# Patient Record
Sex: FEMALE | Race: AMERICAN INDIAN OR ALASKA NATIVE
[De-identification: names, ages, dates, MRNs, and addresses within clinical notes are randomized per-mention and may not be internally consistent; named-entity substitution may affect disease eponyms.]

---

## 2017-12-20 ENCOUNTER — HOSPITAL ENCOUNTER (INPATIENT)
Dept: HOSPITAL 42 - ED | Age: 37
LOS: 8 days | Discharge: NURSING, REHAB OR LONG-TERM CARE | DRG: 560 | End: 2017-12-28
Attending: INTERNAL MEDICINE | Admitting: INTERNAL MEDICINE
Payer: MEDICAID

## 2017-12-20 DIAGNOSIS — K59.00: ICD-10-CM

## 2017-12-20 DIAGNOSIS — Z53.29: ICD-10-CM

## 2017-12-20 DIAGNOSIS — D53.9: ICD-10-CM

## 2017-12-20 DIAGNOSIS — M48.061: ICD-10-CM

## 2017-12-20 DIAGNOSIS — M19.90: ICD-10-CM

## 2017-12-20 DIAGNOSIS — F40.240: ICD-10-CM

## 2017-12-20 DIAGNOSIS — R40.2412: ICD-10-CM

## 2017-12-20 DIAGNOSIS — M50.221: Primary | ICD-10-CM

## 2017-12-20 DIAGNOSIS — M50.223: ICD-10-CM

## 2017-12-20 DIAGNOSIS — Z88.1: ICD-10-CM

## 2017-12-20 DIAGNOSIS — E53.8: ICD-10-CM

## 2017-12-20 DIAGNOSIS — E66.01: ICD-10-CM

## 2017-12-20 DIAGNOSIS — M50.222: ICD-10-CM

## 2017-12-20 DIAGNOSIS — G82.50: ICD-10-CM

## 2017-12-20 LAB
ALBUMIN/GLOB SERPL: 1.1 {RATIO} (ref 1.1–1.8)
ALP SERPL-CCNC: 80 U/L (ref 38–126)
ALT SERPL-CCNC: 40 U/L (ref 7–56)
AST SERPL-CCNC: 33 U/L (ref 14–36)
BASOPHILS # BLD AUTO: 0.04 K/MM3 (ref 0–2)
BASOPHILS NFR BLD: 0.6 % (ref 0–3)
BILIRUB SERPL-MCNC: 0.3 MG/DL (ref 0.2–1.3)
BUN SERPL-MCNC: 13 MG/DL (ref 7–21)
CALCIUM SERPL-MCNC: 9.5 MG/DL (ref 8.4–10.5)
CHLORIDE SERPL-SCNC: 106 MMOL/L (ref 98–107)
CO2 SERPL-SCNC: 23 MMOL/L (ref 21–33)
EOSINOPHIL # BLD: 0.2 10*3/UL (ref 0–0.7)
EOSINOPHIL NFR BLD: 2.1 % (ref 1.5–5)
ERYTHROCYTE [DISTWIDTH] IN BLOOD BY AUTOMATED COUNT: 13.6 % (ref 11.5–14.5)
GLOBULIN SER-MCNC: 3.6 GM/DL
GLUCOSE SERPL-MCNC: 93 MG/DL (ref 70–110)
GRANULOCYTES # BLD: 3.65 10*3/UL (ref 1.4–6.5)
GRANULOCYTES NFR BLD: 50.4 % (ref 50–68)
HCT VFR BLD CALC: 37 % (ref 36–48)
LYMPHOCYTES # BLD: 2.7 10*3/UL (ref 1.2–3.4)
LYMPHOCYTES NFR BLD AUTO: 38 % (ref 22–35)
MCH RBC QN AUTO: 35.1 PG (ref 25–35)
MCHC RBC AUTO-ENTMCNC: 33.5 G/DL (ref 31–37)
MCV RBC AUTO: 104.8 FL (ref 80–105)
MONOCYTES # BLD AUTO: 0.6 10*3/UL (ref 0.1–0.6)
MONOCYTES NFR BLD: 8.9 % (ref 1–6)
PLATELET # BLD: 388 10^3/UL (ref 120–450)
PMV BLD AUTO: 10.2 FL (ref 7–11)
POTASSIUM SERPL-SCNC: 3.9 MMOL/L (ref 3.6–5)
PROT SERPL-MCNC: 7.6 G/DL (ref 5.8–8.3)
SODIUM SERPL-SCNC: 140 MMOL/L (ref 132–148)
WBC # BLD AUTO: 7.2 10^3/UL (ref 4.5–11)

## 2017-12-20 NOTE — CT
EXAM:

  CT Head Without Intravenous Contrast



CLINICAL HISTORY:

  37 years old, female; Signs and symptoms; Walking, difficulty; Additional 

info: Unable to walk since 12/08/2017



TECHNIQUE:

  Axial computed tomography images of the head/brain without intravenous 

contrast.  All CT scans at this facility use one or more dose reduction 

techniques, viz.: automated exposure control; ma/kV adjustment per patient size 

(including targeted exams where dose is matched to indication; i.e. head); or 

iterative reconstruction technique.



COMPARISON:

  No relevant prior studies available.



FINDINGS:

  Brain:  No intracranial hemorrhage.  No mass.  No definite edema.

  Ventricles:  No hydrocephalus.

  Bones/joints:  No acute fracture.

  Soft tissues:  Unremarkable.

  Sinuses:  No acute sinusitis.

  Mastoid air cells:  No mastoid effusion.

  Orbits:  Unremarkable as visualized.



IMPRESSION:     

1.  No definite acute intracranial abnormality.

## 2017-12-20 NOTE — CT
EXAM:

  CT Lumbar Spine Without Intravenous Contrast



CLINICAL HISTORY:

  37 years old, female; Signs and symptoms; Other: Unable to walk since 

12/8/17; Additional info: Unable to walk since 12/08/2017



TECHNIQUE:

  Axial computed tomography images of the lumbar spine without intravenous 

contrast.  All CT scans at this facility use one or more dose reduction 

techniques, viz.: automated exposure control; ma/kV adjustment per patient size 

(including targeted exams where dose is matched to indication; i.e. head); or 

iterative reconstruction technique.

  Coronal and sagittal reformatted images were created and reviewed.



COMPARISON:

  No relevant prior studies available.



FINDINGS:

  Vertebrae:  No acute fracture.  Facet osteoarthrosis within lower lumbar 

spine.

  Discs/spinal canal/neural foramina:  Mild central canal stenosis L4-L5 level. 

 Mild neuroforaminal narrowing at L4-L5, and L5-S1 levels.

  Soft tissues:  Partial ossification of anterior longitudinal ligament.



IMPRESSION:     

1.  No fracture.

2.  If symptoms persist, consider MRI for further evaluation.

3.  Incidental/non-acute findings are described above.

## 2017-12-21 LAB
ALBUMIN/GLOB SERPL: 1.2 {RATIO} (ref 1.1–1.8)
ALP SERPL-CCNC: 76 U/L (ref 38–126)
ALT SERPL-CCNC: 42 U/L (ref 7–56)
APPEARANCE UR: CLEAR
AST SERPL-CCNC: 31 U/L (ref 14–36)
BACTERIA #/AREA URNS HPF: (no result) /[HPF]
BASOPHILS # BLD AUTO: 0.01 K/MM3 (ref 0–2)
BASOPHILS NFR BLD: 0.2 % (ref 0–3)
BILIRUB SERPL-MCNC: 0.4 MG/DL (ref 0.2–1.3)
BILIRUB UR-MCNC: NEGATIVE MG/DL
BUN SERPL-MCNC: 11 MG/DL (ref 7–21)
CALCIUM SERPL-MCNC: 9.4 MG/DL (ref 8.4–10.5)
CHLORIDE SERPL-SCNC: 106 MMOL/L (ref 98–107)
CO2 SERPL-SCNC: 25 MMOL/L (ref 21–33)
COLOR UR: YELLOW
EOSINOPHIL # BLD: 0.1 10*3/UL (ref 0–0.7)
EOSINOPHIL NFR BLD: 1.7 % (ref 1.5–5)
ERYTHROCYTE [DISTWIDTH] IN BLOOD BY AUTOMATED COUNT: 13.7 % (ref 11.5–14.5)
FOLATE SERPL-MCNC: 11 NG/ML
GLOBULIN SER-MCNC: 3.3 GM/DL
GLUCOSE SERPL-MCNC: 81 MG/DL (ref 70–110)
GLUCOSE UR STRIP-MCNC: NEGATIVE MG/DL
GRANULOCYTES # BLD: 3.52 10*3/UL (ref 1.4–6.5)
GRANULOCYTES NFR BLD: 54.4 % (ref 50–68)
HCT VFR BLD CALC: 35.5 % (ref 36–48)
KETONES UR STRIP-MCNC: 15 MG/DL
LEUKOCYTE ESTERASE UR-ACNC: (no result) LEU/UL
LYMPHOCYTES # BLD: 2.2 10*3/UL (ref 1.2–3.4)
LYMPHOCYTES NFR BLD AUTO: 34.6 % (ref 22–35)
MAGNESIUM SERPL-MCNC: 2 MG/DL (ref 1.7–2.2)
MCH RBC QN AUTO: 34.9 PG (ref 25–35)
MCHC RBC AUTO-ENTMCNC: 33.2 G/DL (ref 31–37)
MCV RBC AUTO: 105 FL (ref 80–105)
MONOCYTES # BLD AUTO: 0.6 10*3/UL (ref 0.1–0.6)
MONOCYTES NFR BLD: 9.1 % (ref 1–6)
PH UR STRIP: 6 [PH] (ref 4.7–8)
PHOSPHATE SERPL-MCNC: 4.3 MG/DL (ref 2.5–4.5)
PLATELET # BLD: 381 10^3/UL (ref 120–450)
PMV BLD AUTO: 10.5 FL (ref 7–11)
POTASSIUM SERPL-SCNC: 4.1 MMOL/L (ref 3.6–5)
PROT SERPL-MCNC: 7.1 G/DL (ref 5.8–8.3)
PROT UR STRIP-MCNC: NEGATIVE MG/DL
RBC # UR STRIP: (no result) /UL
RBC #/AREA URNS HPF: (no result) /HPF (ref 0–2)
SODIUM SERPL-SCNC: 141 MMOL/L (ref 132–148)
SP GR UR STRIP: 1.02 (ref 1–1.03)
UROBILINOGEN UR STRIP-ACNC: 0.2 E.U./DL
WBC # BLD AUTO: 6.5 10^3/UL (ref 4.5–11)
WBC #/AREA URNS HPF: (no result) /HPF (ref 0–6)

## 2017-12-21 NOTE — RAD
PROCEDURE:  CHEST RADIOGRAPH, 1 VIEW



HISTORY:

medical clearance



COMPARISON:

None available.



FINDINGS:



LUNGS:

Clear.



PLEURA:

No pneumothorax or pleural fluid seen.



CARDIOVASCULAR:

Normal.



OSSEOUS STRUCTURES:

No significant abnormalities.



VISUALIZED UPPER ABDOMEN:

Normal.



OTHER FINDINGS:

None. 



IMPRESSION:

No active disease.

## 2017-12-21 NOTE — CP.PCM.HP
<Nico Storey - Last Filed: 12/21/17 04:29>





History of Present Illness





- History of Present Illness


History of Present Illness: 





Nico Storey DO PGY1 - Internal Medicine H&P





CC: Weak legs





HPI: 36 yo F with no significant PMH presents to the ER by ambulance brought 

from [facility], complaining of progressive weakness in all 

four extremities. Weakness started 9 days ago, which was the last time she was 

able to walk. She was [place] [facility] [place] had an extensive 

workup done, which was reportedly negative. She was [place] [facility] about 

one week ago and sent to [facility] for physical therapy. She reports 

continued inability to walk or bear weight on her legs beyond a few seconds, as 

well as progressive weakness in her arms. She denies any headache, vision 

changes, numbness, parasthesias. She reports that weakness is worse on the left 

side. She denies recent illness, travel, sick contacts, bug bites. She also 

denies any major life changes or new stressors. She is complaining of 

occasional back spasms that radiate from her head all the way to her toes, 

which she has had before, though is occuring more frequently; no constant back 

pain or pain with movements. She denies any fever, chills, nausea, vomiting, 

diarrhea, chest pain, shortness of breath, abdominal pain, dysuria, hematuria, 

joint pains, confusion, depression/anxiety, trouble sleeping, heat/cold 

intolerance, urinary/bowel incontinence, saddle anaesthesia. Remainder of 12 

point ROS negative. The weakness was progressive in onset.





PMH: None


PSH: Denies


Soc: Denies tobacco, alcohol, or illicits


FHx: Denies


All: Amoxicillin





Present on Admission





- Present on Admission


Any Indicators Present on Admission: No





Past Patient History





- Past Social History


Smoking Status: n





- PSYCHIATRIC


Hx Substance Use: No





Meds


Allergies/Adverse Reactions: 


 Allergies











Allergy/AdvReac Type Severity Reaction Status Date / Time


 


amoxicillin Allergy  SHORTNESS Verified 12/20/17 19:47





   OF BREATH  














Physical Exam





- Constitutional


Appears: Non-toxic, No Acute Distress


Additional comments: 


Morbidly obese





- Head Exam


Head Exam: ATRAUMATIC, NORMOCEPHALIC





- Eye Exam


Eye Exam: EOMI, Normal appearance, PERRL


Pupil Exam: NORMAL ACCOMODATION





- ENT Exam


ENT Exam: Mucous Membranes Moist





- Neck Exam


Neck exam: Positive for: Full Rom, Normal Inspection.  Negative for: Meningismus





- Respiratory Exam


Respiratory Exam: Clear to Auscultation Bilateral, NORMAL BREATHING PATTERN





- Cardiovascular Exam


Cardiovascular Exam: RRR, +S1, +S2





- GI/Abdominal Exam


GI & Abdominal Exam: Normal Bowel Sounds, Soft.  absent: Distended, Firm, 

Rebound, Rigid, Tenderness





- Extremities Exam


Extremities exam: Positive for: full ROM, normal inspection.  Negative for: 

calf tenderness, joint swelling, pedal edema, tenderness





- Back Exam


Back exam: muscle spasm.  absent: paraspinal tenderness





- Neurological Exam


Neurological exam: Alert, CN II-XII Intact, Oriented x3


Additional comments: 





Bilateral biceps reflexes 1+/4


Patella reflexes could not be assessed, patient not compliant with exam


5/5  strength bilaterally; constantly gesturing strangely with her hands, 

holds cup to drink with difficulty; uses her cell phone normally


In attempt to stand/ambulate patient, she stood for approximately 4 seconds, 

and took 3 steps forward, before slowly collapsing to the floor


She was unable to stand back up unassisted, and required 5 people to help her 

get back to her feet.


Sensation grossly intact, with good sharp/dull discrimination


Patient is spontaneously moving and resting all four extremities normally, 

unconciously; when assessed directly, patient appears to have poor control of 

her movements





- Psychiatric Exam


Psychiatric exam: Normal Affect, Normal Mood





- Skin


Skin Exam: Dry, Intact, Normal Color





Results





- Vital Signs


Recent Vital Signs: 





 Last Vital Signs











Temp  98.8 F   12/20/17 19:40


 


Pulse  78   12/21/17 03:00


 


Resp  16   12/21/17 03:00


 


BP  128/82   12/21/17 03:00


 


Pulse Ox  100   12/21/17 03:00














- Labs


Result Diagrams: 


 12/20/17 20:40





 12/20/17 20:40





Assessment & Plan





- Assessment and Plan (Free Text)


Assessment: 





36 yo F with no significant PMH who presents for quadriparesis, progressive, 

for the past 9 days. Previously [place] [facility] and was undergoing physical 

therapy at [facility] in [facility]





Plan





Quadriparesis


- Patient has total inability to bear her own weight, walk, or exhibit strength 

or fine motor control of b/l UE, with marked L finger to nose dysmetria; 

started 9 days ago


- CT head and lumbar spine in ER negative


- Intake labs and vital signs unremarkable


- Will attempt to obtain prior records from [facility] prior to further workup; 

consider spinal tap if not previously done to r/o infection, GBS


- High risk fall precautions


- PT/OT eval and treat


- Neurology consult requested, appreciate recs





GI/DVT Ppx - Pepcid, heparin





Patient seen, discussed, and reviewed with attending





<Amadou Frazier - Last Filed: 12/21/17 05:41>





Results





- Vital Signs


Recent Vital Signs: 





 Last Vital Signs











Temp  98.2 F   12/21/17 04:27


 


Pulse  102 H  12/21/17 04:27


 


Resp  20   12/21/17 04:27


 


BP  145/99 H  12/21/17 04:27


 


Pulse Ox  100   12/21/17 03:00














- Labs


Result Diagrams: 


 12/20/17 20:40





 12/20/17 20:40





Attending/Attestation





- Attestation


I have personally seen and examined this patient.: Yes


I have fully participated in the care of the patient.: Yes


I have reviewed all pertinent clinical information: Yes


Notes (Text): 





12/21/17 05:41


Patient was seen in room # 9 when she was in the ER.


Agree with history, physical examination, assessment and plan.

## 2017-12-21 NOTE — MRI
PROCEDURE:  MRI of the brain dated 12/21/2017. 



HISTORY:

Ataxia. 



COMPARISON:

Comparison made with CT scan brain dated 12/20/2017. 



TECHNIQUE:

Multiplanar, multisequence MR images of the brain were obtained with 

and without intravenous contrast enhancement. 20 cc Omniscan injected 

during this procedure. 



FINDINGS:



HEMORRHAGE:

No acute parenchymal, subarachnoid nor extra-axial hemorrhage.  No 

evidence of hemosiderin deposition identified on gradient echo 

weighted sequence.



DWI:

No evidence of an acute or early subacute infarction seen on 

diffusion imaging. .



BRAIN PARENCHYMA:

No focal areas of abnormal signal seen within the substance of the 

brain. No evidence of enhancing parenchymal nor extra-axial masses or 

collections. No evidence of unusual meningeal enhancement. . 



Ventricular and sulcal size within range of normal for this patient's 

stated age. 



ENHANCEMENT:

No abnormal intracranial enhancement as above. .



VENTRICLES:

No obstructive hydrocephalus.



CRANIUM:

There are no acute calvarial abnormalities.



ORBITS:

Orbits and contents appear grossly unremarkable.



PARANASAL SINUSES/MASTOIDS:

Clear



VASCULAR SYSTEM:

The visualized major vascular flow voids at skull base patent.



OTHER FINDINGS:

None .



IMPRESSION:

No acute intracranial hemorrhage or infarct.  There are no enhancing 

lesions.

## 2017-12-22 LAB
ALBUMIN/GLOB SERPL: 1.1 {RATIO} (ref 1.1–1.8)
ALP SERPL-CCNC: 71 U/L (ref 38–126)
ALT SERPL-CCNC: 35 U/L (ref 7–56)
AST SERPL-CCNC: 31 U/L (ref 14–36)
BASOPHILS # BLD AUTO: 0.04 K/MM3 (ref 0–2)
BASOPHILS NFR BLD: 0.6 % (ref 0–3)
BILIRUB SERPL-MCNC: 0.5 MG/DL (ref 0.2–1.3)
BUN SERPL-MCNC: 8 MG/DL (ref 7–21)
CALCIUM SERPL-MCNC: 9.3 MG/DL (ref 8.4–10.5)
CHLORIDE SERPL-SCNC: 106 MMOL/L (ref 98–107)
CO2 SERPL-SCNC: 25 MMOL/L (ref 21–33)
EOSINOPHIL # BLD: 0.1 10*3/UL (ref 0–0.7)
EOSINOPHIL NFR BLD: 2.1 % (ref 1.5–5)
ERYTHROCYTE [DISTWIDTH] IN BLOOD BY AUTOMATED COUNT: 13.8 % (ref 11.5–14.5)
GLOBULIN SER-MCNC: 3.3 GM/DL
GLUCOSE SERPL-MCNC: 83 MG/DL (ref 70–110)
GRANULOCYTES # BLD: 2.73 10*3/UL (ref 1.4–6.5)
GRANULOCYTES NFR BLD: 44.3 % (ref 50–68)
HCT VFR BLD CALC: 36.1 % (ref 36–48)
LYMPHOCYTES # BLD: 2.7 10*3/UL (ref 1.2–3.4)
LYMPHOCYTES NFR BLD AUTO: 44.2 % (ref 22–35)
MAGNESIUM SERPL-MCNC: 1.9 MG/DL (ref 1.7–2.2)
MCH RBC QN AUTO: 35.1 PG (ref 25–35)
MCHC RBC AUTO-ENTMCNC: 33.5 G/DL (ref 31–37)
MCV RBC AUTO: 104.6 FL (ref 80–105)
MONOCYTES # BLD AUTO: 0.5 10*3/UL (ref 0.1–0.6)
MONOCYTES NFR BLD: 8.8 % (ref 1–6)
PHOSPHATE SERPL-MCNC: 4.7 MG/DL (ref 2.5–4.5)
PLATELET # BLD: 389 10^3/UL (ref 120–450)
PMV BLD AUTO: 10.2 FL (ref 7–11)
POTASSIUM SERPL-SCNC: 4 MMOL/L (ref 3.6–5)
PROT SERPL-MCNC: 7.2 G/DL (ref 5.8–8.3)
SODIUM SERPL-SCNC: 140 MMOL/L (ref 132–148)
WBC # BLD AUTO: 6.2 10^3/UL (ref 4.5–11)

## 2017-12-22 NOTE — CP.PCM.PN
<Lashanda Nicholson - Last Filed: 12/22/17 11:51>





Subjective





- Date & Time of Evaluation


Date of Evaluation: 12/22/17


Time of Evaluation: 11:33





- Subjective


Subjective: 





Lashanda Nicholson, PGY1, Medicine Progress Note for Dr Gallo:





Patient seen and examined at bedside. As per nursing staff, pt wants to go back 

to subacute rehab. No acute events overnight. Denies muscle spasms currently, 

weakness, nausea, vomiting, food intolerance, fatigue, numbness, tingling. 

States that her hands seem "swollen" and is having trouble with "coordination 

at times." States that she cannot walk because "her legs give out." She was 

walking fine 9 days PTA. 





Objective





- Vital Signs/Intake and Output


Vital Signs (last 24 hours): 


 











Temp Pulse Resp BP Pulse Ox


 


 98.9 F   91 H  20   132/83   96 


 


 12/22/17 10:47  12/22/17 10:47  12/22/17 10:47  12/22/17 10:47  12/22/17 10:47








Intake and Output: 


 











 12/22/17 12/22/17





 06:59 18:59


 


Intake Total 240 


 


Balance 240 














- Medications


Medications: 


 Current Medications





Docusate Sodium (Colace)  100 mg PO TID [facility]


   Last Admin: 12/22/17 10:18 Dose:  100 mg


Famotidine (Pepcid)  20 mg PO 1000,2200 [facility]


   Last Admin: 12/22/17 10:23 Dose:  Not Given


Heparin Sodium (Porcine) (Heparin)  5,000 units SC Q8 PURA


   PRN Reason: Protocol


   Last Admin: 12/22/17 06:16 Dose:  Not Given


Ibuprofen (Motrin Tab)  600 mg PO Q6H PRN


   PRN Reason: Pain, Mild (1-3)


   Last Admin: 12/21/17 20:49 Dose:  600 mg











- Labs


Labs: 


 





 12/22/17 06:30 





 12/22/17 06:30 











- Constitutional


Appears: Non-toxic, No Acute Distress, Older Than Stated Age





- Head Exam


Head Exam: ATRAUMATIC, NORMOCEPHALIC





- Eye Exam


Eye Exam: EOMI, PERRL.  absent: Conjunctival injection, Nystagmus, Scleral 

icterus


Pupil Exam: NORMAL ACCOMODATION, PERRL.  absent: Irregular, Unequal





- ENT Exam


ENT Exam: Mucous Membranes Moist





- Neck Exam


Neck Exam: Full ROM





- Respiratory Exam


Respiratory Exam: Clear to Ausculation Bilateral.  absent: Accessory Muscle Use

, Chest Wall Tenderness, Decreased Breath Sounds, Respiratory Distress





- Cardiovascular Exam


Cardiovascular Exam: RRR, +S1, +S2.  absent: Murmur





- GI/Abdominal Exam


GI & Abdominal Exam: Soft, Normal Bowel Sounds.  absent: Distended, Tenderness, 

Mass, Organomegaly, Rebound





- Extremities Exam


Extremities Exam: Normal Inspection.  absent: Calf Tenderness, Pedal Edema





- Neurological Exam


Neurological Exam: Alert, Awake, CN II-XII Intact, Oriented x3, Reflexes Normal


Additional comments: 





+ motor strength 5/5 b/l


+ Gross sensation intact throughout. 


Unable to ambulate to assess gait





- Psychiatric Exam


Psychiatric exam: Normal Affect, Normal Mood





- Skin


Skin Exam: Dry, Normal Color, Warm





Assessment and Plan





- Assessment and Plan (Free Text)


Assessment: 





38 yo F with no significant PMH who presents for back/body spasms, inability to 

ambulate (started 9 days PTA): 


[place] [facility] [place] [facility] physical [facility]








Back/body spasms, Inability to ambulate:


2/2 trauma related vs MS vs polymyositis vs Lyme disease vs Vit B12,D, folate 

deficiency vs hypothyroidism vs acute CVA vs other demyelinating condition (ALS

) vs spine related neuropathy vs psychogenic


- Patient has inability to walk and has marked L finger to nose dysmetria; 

started 9 days ago


- Motor strength and sensation intact b/l.


- CXR unremarkable. CT head neg. MRI brain neg for any lesions.


- lumbar spine shows spinal canal stenosis L4-L5, no herniations.


- As per PT, pt was able to stand up yesterday, however, has leg coordination 

issues. recommend SALAZAR. Will f/u with PT for reeval today since patient states 

that she feels better and has no spams today.


- Vitamin B12, folate and TSH all within normal limits.


- Awaiting prior records [place] [facility], will determine further workup then


- Will attempt to obtain prior records from [facility] prior to further workup; 

consider spinal tap if not previously done to r/o infection, GBS


- Consider Lyme serology, HIV/syphilis workup, CPK.


- High risk fall precautions


- Neurology consult requested, patient is well known to dr Roach from previous 

admission at [facility]. Awaiting further workup.


- If no change in patient's status by PT and no further neuro workup, consider 

psych eval.





GI/DVT Ppx - Pepcid, heparin





Patient seen, discussed, and reviewed with Dr Gallo.


Lashanda Nicholson, PGY1





<Noemi Gallo - Last Filed: 12/22/17 16:15>





Objective





- Vital Signs/Intake and Output


Vital Signs (last 24 hours): 


 











Temp Pulse Resp BP Pulse Ox


 


 98.9 F   105 H  20   132/83   99 


 


 12/22/17 10:47  12/22/17 15:32  12/22/17 10:47  12/22/17 10:47  12/22/17 15:32








Intake and Output: 


 











 12/22/17 12/22/17





 06:59 18:59


 


Intake Total 240 


 


Balance 240 














- Medications


Medications: 


 Current Medications





Docusate Sodium (Colace)  100 mg PO TID [facility]


   Last Admin: 12/22/17 13:13 Dose:  100 mg


Famotidine (Pepcid)  20 mg PO 1000,2200 [facility]


   Last Admin: 12/22/17 10:23 Dose:  Not Given


Heparin Sodium (Porcine) (Heparin)  5,000 units SC Q8 PURA


   PRN Reason: Protocol


   Last Admin: 12/22/17 13:06 Dose:  Not Given


Ibuprofen (Motrin Tab)  600 mg PO Q6H PRN


   PRN Reason: Pain, Mild (1-3)


   Last Admin: 12/22/17 13:14 Dose:  600 mg











- Labs


Labs: 


 





 12/22/17 06:30 





 12/22/17 06:30 











Attending/Attestation





- Attestation


I have personally seen and examined this patient.: Yes


I have fully participated in the care of the patient.: Yes


I have reviewed all pertinent clinical information, including history, physical 

exam and plan: Yes


Notes (Text): 





12/22/17 16:11


37 year old female with no significant past medical history who presented with 

body spasms and difficulty with ambulation.


She was previously worked [place] [facility] and then sent to [facility] from where she was 

referred due to persistent symptoms.


CT head and MRI brain were negative for acute findings.  CT spine showed L4-L5 

spinal canal stenosis.


PT and OT evaluation were appreciated.  Patient was also seen by neurology who 

recommended for possible LP.





Noemi Gallo MD


Hospitalist.

## 2017-12-23 LAB
ALBUMIN/GLOB SERPL: 1.1 {RATIO} (ref 1.1–1.8)
ALP SERPL-CCNC: 70 U/L (ref 38–126)
ALT SERPL-CCNC: 38 U/L (ref 7–56)
AST SERPL-CCNC: 26 U/L (ref 14–36)
BASOPHILS # BLD AUTO: 0.03 K/MM3 (ref 0–2)
BASOPHILS NFR BLD: 0.5 % (ref 0–3)
BILIRUB SERPL-MCNC: 0.3 MG/DL (ref 0.2–1.3)
BUN SERPL-MCNC: 9 MG/DL (ref 7–21)
CALCIUM SERPL-MCNC: 9.3 MG/DL (ref 8.4–10.5)
CHLORIDE SERPL-SCNC: 105 MMOL/L (ref 98–107)
CO2 SERPL-SCNC: 25 MMOL/L (ref 21–33)
EOSINOPHIL # BLD: 0.1 10*3/UL (ref 0–0.7)
EOSINOPHIL NFR BLD: 2.1 % (ref 1.5–5)
ERYTHROCYTE [DISTWIDTH] IN BLOOD BY AUTOMATED COUNT: 13.7 % (ref 11.5–14.5)
ERYTHROCYTE [SEDIMENTATION RATE] IN BLOOD: 40 MM/HR (ref 0–20)
GLOBULIN SER-MCNC: 3.3 GM/DL
GLUCOSE SERPL-MCNC: 90 MG/DL (ref 70–110)
GRANULOCYTES # BLD: 2.79 10*3/UL (ref 1.4–6.5)
GRANULOCYTES NFR BLD: 47.7 % (ref 50–68)
HCT VFR BLD CALC: 36.5 % (ref 36–48)
LYMPHOCYTES # BLD: 2.4 10*3/UL (ref 1.2–3.4)
LYMPHOCYTES NFR BLD AUTO: 41 % (ref 22–35)
MCH RBC QN AUTO: 35.3 PG (ref 25–35)
MCHC RBC AUTO-ENTMCNC: 34 G/DL (ref 31–37)
MCV RBC AUTO: 104 FL (ref 80–105)
MONOCYTES # BLD AUTO: 0.5 10*3/UL (ref 0.1–0.6)
MONOCYTES NFR BLD: 8.7 % (ref 1–6)
PLATELET # BLD: 373 10^3/UL (ref 120–450)
PMV BLD AUTO: 10.2 FL (ref 7–11)
POTASSIUM SERPL-SCNC: 4 MMOL/L (ref 3.6–5)
PROT SERPL-MCNC: 7.1 G/DL (ref 5.8–8.3)
SODIUM SERPL-SCNC: 139 MMOL/L (ref 132–148)
WBC # BLD AUTO: 5.9 10^3/UL (ref 4.5–11)

## 2017-12-23 RX ADMIN — IMMUNE GLOBULIN SCH MLS/MIN: 100 SOLUTION INTRAVENOUS at 23:34

## 2017-12-23 NOTE — PN
DATE:



SUBJECTIVE:  This is a 37-year-old black female who was recently discharged

from the [facility], who went to [facility], undergoing

physical therapy, felt weak, transferred here to [facility].  She had

MRI of the head, which was reported negative, but the patient still have

difficulty ambulating and looks like she does not decreased _____ in the

lower limbs and wanted to do spinal tap, the patient refused.  So, I spoke

to Dr. Gallo, for consult from Infectious Disease and start IVIG for three

days and continue physical therapy.  We will follow up.





__________________________________________

Mike Roach MD





DD:  12/23/2017 15:25:24

DT:  12/23/2017 18:36:38

Job # [id number]

## 2017-12-23 NOTE — CP.PCM.PN
<Lashanda Nicholson - Last Filed: 12/23/17 12:51>





Subjective





- Date & Time of Evaluation


Date of Evaluation: 12/23/17


Time of Evaluation: 12:51





- Subjective


Subjective: 





Lashanda Nicholson, PGY1, Medicine Progress Note for Dr Gallo:





Patient seen and examined at bedside. No acute events overnight. Pt reports 

some back muscle spasm and lower extremities muscle spasms this AM, however 

currently denies. She states that Motrin is helping her to reduce the 

inflammation, does not want Flexeril or Baclofen. Yesterday, she was only able 

to stand up barely with PT, and did leg exercises in the bed itself. Was not 

able to walk. Denies weakness, numbness, tingling, fever, chills.





Objective





- Vital Signs/Intake and Output


Vital Signs (last 24 hours): 


 











Temp Pulse Resp BP Pulse Ox


 


 97.8 F   99 H  20   112/72   99 


 


 12/23/17 07:30  12/23/17 07:30  12/23/17 07:30  12/23/17 07:30  12/23/17 07:30








Intake and Output: 


 











 12/23/17 12/23/17





 06:59 18:59


 


Intake Total 0 


 


Output Total 0 


 


Balance 0 














- Medications


Medications: 


 Current Medications





Docusate Sodium (Colace)  100 mg PO TID [facility]


   Last Admin: 12/23/17 10:58 Dose:  100 mg


Famotidine (Pepcid)  20 mg PO 1000,2200 [facility]


   Last Admin: 12/23/17 10:58 Dose:  20 mg


Heparin Sodium (Porcine) (Heparin)  5,000 units SC Q8 PURA


   PRN Reason: Protocol


   Last Admin: 12/23/17 05:01 Dose:  Not Given


Ibuprofen (Motrin Tab)  600 mg PO Q6H PRN


   PRN Reason: Pain, Mild (1-3)


   Last Admin: 12/23/17 01:11 Dose:  600 mg











- Labs


Labs: 


 





 12/23/17 06:45 





 12/23/17 06:45 











- Additional Findings


Additional findings: 





- Constitutional


Appears: Non-toxic, No Acute Distress, Older Than Stated Age





- Head Exam


Head Exam: ATRAUMATIC, NORMOCEPHALIC





- Eye Exam


Eye Exam: EOMI, PERRL.  absent: Conjunctival injection, Nystagmus, Scleral 

icterus


Pupil Exam: NORMAL ACCOMODATION, PERRL.  absent: Irregular, Unequal





- ENT Exam


ENT Exam: Mucous Membranes Moist





- Neck Exam


Neck Exam: Full ROM





- Respiratory Exam


Respiratory Exam: Clear to Ausculation Bilateral.  absent: Accessory Muscle Use

, Chest Wall Tenderness, Decreased Breath Sounds, Respiratory Distress





- Cardiovascular Exam


Cardiovascular Exam: RRR, +S1, +S2.  absent: Murmur





- GI/Abdominal Exam


GI & Abdominal Exam: Soft, Normal Bowel Sounds.  absent: Distended, Tenderness, 

Mass, Organomegaly, Rebound





- Extremities Exam


Extremities Exam: Normal Inspection.  absent: Calf Tenderness, Pedal Edema





- Neurological Exam


Neurological Exam: Alert, Awake, CN II-XII Intact, Oriented x3, Reflexes Normal


Additional comments: 





+ motor strength 5/5 b/l


+ Gross sensation intact throughout. 


Unable to ambulate to assess gait





- Psychiatric Exam


Psychiatric exam: Normal Affect, Normal Mood





- Skin


Skin Exam: Dry, Normal Color, Warm





Assessment and Plan





- Assessment and Plan (Free Text)


Assessment: 





38 yo F with no significant PMH who presents for back/body spasms, inability to 

ambulate (started 9 days PTA): 


Previously worked up at [facility] and was undergoing physical therapy at [facility] in [facility]








Back/body spasms, Inability to ambulate:


2/2 trauma related vs MS vs polymyositis vs Lyme disease vs Vit B12,D, folate 

deficiency vs hypothyroidism vs acute CVA vs other demyelinating condition (ALS

) vs spine related neuropathy vs psychogenic


- Patient has inability to walk and has marked L finger to nose dysmetria; 

started 9 days ago


- Motor strength and sensation intact b/l.


- CXR unremarkable. CT head neg. MRI brain neg for any lesions.


- lumbar spine shows spinal canal stenosis L4-L5, no herniations.


- As per PT, pt was able to stand up, however, has leg coordination issues. 

recommend SALAZAR.


- Vitamin B12, folate and TSH all within normal limits.


- Received prior records from [facility], MRI head was normal and Lumbar MRI showed 

congential narrowing of lumbar spinal canal. No other serologic workup done.


- F/u HIV ab and rpr. 


- Consider Lyme serology, CPK.


- High risk fall precautions


- Neurology consult requested, patient is well known to dr Roach from previous 

admission at [facility]. Discussed case with Dr Roach, will perform 

spinal tap to r/o infection, GBS. Appreciate help.


- PT reeval 12/22 recommends SALAZAR.


- Consider psych eval if LP and other serologic workup is negative.





GI/DVT Ppx - Pepcid, heparin





Patient seen, discussed, and reviewed with Dr Gallo.


Lashanda Nicholson, PGY1





<Noemi Gallo A - Last Filed: 12/23/17 13:20>





Objective





- Vital Signs/Intake and Output


Vital Signs (last 24 hours): 


 











Temp Pulse Resp BP Pulse Ox


 


 97.8 F   99 H  20   112/72   99 


 


 12/23/17 07:30  12/23/17 07:30  12/23/17 07:30  12/23/17 07:30  12/23/17 07:30








Intake and Output: 


 











 12/23/17 12/23/17





 06:59 18:59


 


Intake Total 0 


 


Output Total 0 


 


Balance 0 














- Medications


Medications: 


 Current Medications





Docusate Sodium (Colace)  100 mg PO TID [facility]


   Last Admin: 12/23/17 10:58 Dose:  100 mg


Famotidine (Pepcid)  20 mg PO 1000,2200 [facility]


   Last Admin: 12/23/17 10:58 Dose:  20 mg


Heparin Sodium (Porcine) (Heparin)  5,000 units SC Q8 PURA


   PRN Reason: Protocol


   Last Admin: 12/23/17 05:01 Dose:  Not Given


Ibuprofen (Motrin Tab)  600 mg PO Q6H PRN


   PRN Reason: Pain, Mild (1-3)


   Last Admin: 12/23/17 01:11 Dose:  600 mg











- Labs


Labs: 


 





 12/23/17 06:45 





 12/23/17 06:45 











Attending/Attestation





- Attestation


I have personally seen and examined this patient.: Yes


I have fully participated in the care of the patient.: Yes


I have reviewed all pertinent clinical information, including history, physical 

exam and plan: Yes


Notes (Text): 





12/23/17 13:19


37 year old female with no significant past medical history who presented with 

body spasms and difficulty with ambulation.


She was previously worked up at [facility] and then sent to [facility] from where she was 

referred due to persistent symptoms.


CT head and MRI brain were negative for acute findings.  CT spine showed L4-L5 

spinal canal stenosis.


PT and OT evaluation were appreciated.  She is also being followed by neurology

; plan is for possible LP.





Noemi Gallo MD


Hospitalist.

## 2017-12-24 LAB
ALBUMIN/GLOB SERPL: 0.9 {RATIO} (ref 1.1–1.8)
ALP SERPL-CCNC: 75 U/L (ref 38–126)
ALT SERPL-CCNC: 30 U/L (ref 7–56)
AST SERPL-CCNC: 36 U/L (ref 14–36)
BASOPHILS # BLD AUTO: 0.02 K/MM3 (ref 0–2)
BASOPHILS NFR BLD: 0.4 % (ref 0–3)
BILIRUB SERPL-MCNC: 0.5 MG/DL (ref 0.2–1.3)
BUN SERPL-MCNC: 9 MG/DL (ref 7–21)
CALCIUM SERPL-MCNC: 9.6 MG/DL (ref 8.4–10.5)
CHLORIDE SERPL-SCNC: 105 MMOL/L (ref 98–107)
CO2 SERPL-SCNC: 25 MMOL/L (ref 21–33)
EOSINOPHIL # BLD: 0.1 10*3/UL (ref 0–0.7)
EOSINOPHIL NFR BLD: 1.7 % (ref 1.5–5)
ERYTHROCYTE [DISTWIDTH] IN BLOOD BY AUTOMATED COUNT: 13.8 % (ref 11.5–14.5)
GLOBULIN SER-MCNC: 4 GM/DL
GLUCOSE SERPL-MCNC: 85 MG/DL (ref 70–110)
GRANULOCYTES # BLD: 2.29 10*3/UL (ref 1.4–6.5)
GRANULOCYTES NFR BLD: 48.4 % (ref 50–68)
HCT VFR BLD CALC: 35.8 % (ref 36–48)
LYMPHOCYTES # BLD: 2 10*3/UL (ref 1.2–3.4)
LYMPHOCYTES NFR BLD AUTO: 41.1 % (ref 22–35)
MCH RBC QN AUTO: 34.6 PG (ref 25–35)
MCHC RBC AUTO-ENTMCNC: 33.2 G/DL (ref 31–37)
MCV RBC AUTO: 104.1 FL (ref 80–105)
MONOCYTES # BLD AUTO: 0.4 10*3/UL (ref 0.1–0.6)
MONOCYTES NFR BLD: 8.4 % (ref 1–6)
PLATELET # BLD: 359 10^3/UL (ref 120–450)
PMV BLD AUTO: 10.3 FL (ref 7–11)
POTASSIUM SERPL-SCNC: 3.9 MMOL/L (ref 3.6–5)
PROT SERPL-MCNC: 7.7 G/DL (ref 5.8–8.3)
SODIUM SERPL-SCNC: 139 MMOL/L (ref 132–148)
WBC # BLD AUTO: 4.7 10^3/UL (ref 4.5–11)

## 2017-12-24 RX ADMIN — IMMUNE GLOBULIN SCH MLS/MIN: 100 SOLUTION INTRAVENOUS at 14:13

## 2017-12-24 NOTE — PN
DATE:  12/24/2017



SUBJECTIVE:  The patient is in bed, in no acute distress, nontoxic.  She is

still refusing spinal tap.



ASSESSMENT AND PLAN:  This is a 37-year-old with morbid obesity female with

body mass index of 42, who was admitted with ataxia, currently workup is in

progress.  The patient does have macrocytic anemia with an MCV of 105, does

have an elevated sed rate and high phosphorus.  C-reactive protein is

normal.  Negative HIV and negative RPR.  Workup in progress.  The patient

is for MRI of the spine.  Recommended Hematology evaluation for

myelodysplastic syndrome plus flow cytometry has been requested.  We will

follow with you.





__________________________________________

Adan Enriquez MD





DD:  12/24/2017 14:38:14

DT:  12/24/2017 18:21:08

Job # [id number]

## 2017-12-24 NOTE — CP.PCM.PN
<Lashanda Nicholson - Last Filed: 12/24/17 18:05>





Subjective





- Date & Time of Evaluation


Date of Evaluation: 12/24/17


Time of Evaluation: 18:05





- Subjective


Subjective: 





Lashanda Nicholson, PGY1, Medicine Progress Note for Dr Gallo:





Patient seen and examined at bedside. Patient refused Lumbar puncture yesterday 

(she is afraid of the complications of bleeding, infection or death) - the 

risks and benefits of the procedure were well explained to her. She states that 

she needs time to think about it. Pt still c/o incoordination of her hands and 

inability to walk. She states that her muscle spasms are unchanged, but would 

like avoid all medications as possible (she never took medications all her life 

and states thats the reason the meds dont suit her). She denies weakness, 

fatigue, nausea, vomiting. 





Objective





- Vital Signs/Intake and Output


Vital Signs (last 24 hours): 


 











Temp Pulse Resp BP Pulse Ox


 


 98.2 F   97 H  18   141/89   98 


 


 12/24/17 16:49  12/24/17 16:49  12/24/17 16:49  12/24/17 16:49  12/24/17 16:49








Intake and Output: 


 











 12/24/17 12/24/17





 06:59 18:59


 


Intake Total 420 960


 


Balance 420 960














- Medications


Medications: 


 Current Medications





Acetaminophen/Butalbital/Caffeine (Fioricet)  1 tab PO Q4H PRN


   PRN Reason: Headache


Docusate Sodium (Colace)  100 mg PO TID [facility]


   Last Admin: 12/24/17 17:07 Dose:  100 mg


Famotidine (Pepcid)  20 mg PO 1000,2200 [facility]


   Last Admin: 12/24/17 11:03 Dose:  Not Given


Heparin Sodium (Porcine) (Heparin)  5,000 units SC Q8 [facility]


   PRN Reason: Protocol


   Last Admin: 12/24/17 13:48 Dose:  5,000 units


Immune Globulin 40 gm/ (Miscellaneous)  400 mls @ 1 mls/min IV DAILY [facility]


   Stop: 12/25/17 23:55


   Last Admin: 12/24/17 14:13 Dose:  1 mls/min


Ibuprofen (Motrin Tab)  600 mg PO Q6H PRN


   PRN Reason: Pain, Mild (1-3)


   Last Admin: 12/24/17 13:48 Dose:  600 mg











- Labs


Labs: 


 





 12/24/17 06:30 





 12/24/17 06:30 











- Constitutional


Appears: Non-toxic, No Acute Distress





- Head Exam


Head Exam: ATRAUMATIC, NORMOCEPHALIC





- Eye Exam


Eye Exam: EOMI, PERRL


Pupil Exam: NORMAL ACCOMODATION, PERRL





- ENT Exam


ENT Exam: Mucous Membranes Moist





- Neck Exam


Neck Exam: Full ROM





- Respiratory Exam


Respiratory Exam: Clear to Ausculation Bilateral.  absent: Accessory Muscle Use

, Respiratory Distress





- Cardiovascular Exam


Cardiovascular Exam: RRR, +S1, +S2.  absent: Murmur





- GI/Abdominal Exam


GI & Abdominal Exam: Soft, Normal Bowel Sounds.  absent: Distended, Tenderness, 

Mass, Organomegaly, Rebound





- Extremities Exam


Extremities Exam: absent: Calf Tenderness, Pedal Edema





- Back Exam


Back Exam: NORMAL INSPECTION





- Neurological Exam


Neurological Exam: Alert, Awake, CN II-XII Intact, Oriented x3


Neuro motor strength exam: Left Upper Extremity: 5, Right Upper Extremity: 5, 

Left Lower Extremity: 5, Right Lower Extremity: 5


Additional comments: 





+ discoordinating movements of bilateral fingers.





- Psychiatric Exam


Psychiatric exam: Normal Affect





- Skin


Skin Exam: Dry, Normal Color, Warm





Assessment and Plan





- Assessment and Plan (Free Text)


Assessment: 





36 yo F with no significant PMH who presents for back/body spasms, 

incoordination, ataxia (started 9 days PTA): 








Back/body spasms, Ataxia:


2/2 trauma related vs MS vs polymyositis vs Lyme disease vs Vit B12,D, folate 

deficiency vs hypothyroidism vs acute CVA vs other demyelinating condition (ALS

) vs MDS vs spine related neuropathy vs psychogenic


- Patient has inability to walk and has marked L finger to nose dysmetria; 

started 9 days ago


- Motor strength and sensation intact b/l.


- CXR unremarkable. CT head neg. MRI brain neg for any lesions.


- lumbar spine shows spinal canal stenosis L4-L5, no herniations.


- As per PT, pt was able to stand up, however, has leg coordination issues. 

recommend SALAZAR.


- Vitamin B12, folate and TSH all within normal limits.


- Received prior records from [facility] [place], MRI head was normal and Lumbar MRI showed 

congential narrowing of lumbar spinal canal. No other serologic workup done.


- Rpr NR. 


- ID consulted. appreciate recs. F/u lupus, antiphospholipid ab, Lyme disease, 

treponema serology, MMA, ASHKAN. 


- Heme consulted for macrocytic anemia. Pt not an alcoholic or found to be B12/

folate deficient. Consider MDS, f/u recs.


- High risk fall precautions


- Neurology consult requested, patient is well known to dr Roach from previous 

admission at [facility]. Pt refused spinal tap. will reconsider. 

Appreciate recs. IVIG started as per Neuro. Patient will receive 2 days of 

IViG. Pharmacy called to state that they do not have IVIg for 3rd day on 12/25. 

Will try to arrange for IVIG on 12/26.


- PT reeval 12/22 recommends SALAZAR.


- Whole spine MRI with and w/o gadolinum ordered, pt refused today as pt is 

claustrophobic. Pt offered Valium, however pt does not want any medications. Pt 

states that she will reconsider getting MRI after some time.


- Cont to closely monitor patient's symptoms and serologic workup.





GI/DVT Ppx - Pepcid, heparin





Patient seen, discussed, and reviewed with Dr Gallo.


Lashanda Nicholson, PGY1





<Noemi Gallo - Last Filed: 12/24/17 21:52>





Objective





- Vital Signs/Intake and Output


Vital Signs (last 24 hours): 


 











Temp Pulse Resp BP Pulse Ox


 


 98.2 F   97 H  18   141/89   98 


 


 12/24/17 16:49  12/24/17 16:49  12/24/17 16:49  12/24/17 16:49  12/24/17 16:49








Intake and Output: 


 











 12/24/17 12/25/17





 18:59 06:59


 


Intake Total 960 


 


Balance 960 














- Medications


Medications: 


 Current Medications





Acetaminophen/Butalbital/Caffeine (Fioricet)  1 tab PO Q4H PRN


   PRN Reason: Headache


Docusate Sodium (Colace)  100 mg PO TID [facility]


   Last Admin: 12/24/17 17:07 Dose:  100 mg


Famotidine (Pepcid)  20 mg PO 1000,2200 [facility]


   Last Admin: 12/24/17 11:03 Dose:  Not Given


Heparin Sodium (Porcine) (Heparin)  5,000 units SC Q8 [facility]


   PRN Reason: Protocol


   Last Admin: 12/24/17 13:48 Dose:  5,000 units


Immune Globulin 40 gm/ (Miscellaneous)  400 mls @ 1 mls/min IV DAILY [facility]


   Stop: 12/25/17 23:55


   Last Admin: 12/24/17 14:13 Dose:  1 mls/min


Ibuprofen (Motrin Tab)  600 mg PO Q6H PRN


   PRN Reason: Pain, Mild (1-3)


   Last Admin: 12/24/17 13:48 Dose:  600 mg











- Labs


Labs: 


 





 12/24/17 06:30 





 12/24/17 06:30 











Attending/Attestation





- Attestation


I have personally seen and examined this patient.: Yes


I have fully participated in the care of the patient.: Yes


I have reviewed all pertinent clinical information, including history, physical 

exam and plan: Yes


Notes (Text): 





12/24/17 21:49


37 year old female with no significant past medical history who presented with 

body spasms and difficulty with ambulation.


She was previously worked up at [facility] and then sent to [facility] from where she was 

referred due to persistent symptoms.


CT head and MRI brain were negative for acute findings.  CT spine showed L4-L5 

spinal canal stenosis.


PT and OT evaluation were appreciated.  She is also being followed by neurology 

who recommended LP and MRI spine.


She refused LP yesterday and today.  She also refused MRI spine today due to 

claustrophobia.  She is on day 2 of IVIG.


ID evaluation was appreciated who ordered additional workup and requested 

hematology evaluation for macrocytic anemia.





Noemi Gallo MD


Hospitalist.

## 2017-12-24 NOTE — CON
DATE:  12/23/2017



LOCATION:  The patient is seen in room 569.



CHIEF COMPLAINT:  Weakness times several weeks.



HISTORY OF PRESENT ILLNESS:  This is a 37-year-old morbidly obese female

with BMI of 42 with no significant past medical history who was

hospitalized in [facility], she reported same symptoms of

weakness and ataxia.  She had been worked up including MRI, imaging as per

the patient and all was negative.  Has significant amount of blood workup

done according to the patient.



REVIEW OF SYSTEMS:  Reveals no fevers and no chills.  She does have back

spasm.  No abdominal pain, diarrhea, or constipation.  Minimal joint pain. 

No headaches at this time.  She states she did have headache at [facility].



PAST MEDICAL HISTORY:  Significant for morbid obesity with BMI of 41 and

spinal stenosis.



PAST SURGICAL HISTORY:  Noncontributory.



ALLERGIES:  THE PATIENT STATES SHE IS ALLERGIC TO AMOXICILLIN.  THE TYPE OF

ALLERGY IS NOT CLEAR.



MEDICATIONS:  She takes vitamin B12, folic acid and thiamine was given at

[place].



PHYSICAL EXAMINATION

GENERAL:  She is in bed, answering question.

VITAL SIGNS:  With temperature of 98, blood pressure of 95/60, respiratory

rate of 20, and heart rate of 105.

HEENT:  Examination of HEENT is unremarkable.

NECK:  Supple.

LUNGS:  Have decreased breath sounds.

HEART:  Normal S1 and S2.

ABDOMEN:  Soft and nontender.

NEUROLOGIC:  Motor function is 5/5.  She states she cannot climb out of bed

due to her morbid obesity.



LABORATORY DATA:  WBC count of 5.9, hemoglobin of 12, and platelets of 373.

The patient's sedimentation rate is 40.  She does have an MCV of 105 and

hemoglobin of 11.8.  BUN of 11 and creatinine of 0.7.  Vitamin B12 is 690,

folate is 11, and TSH is 1.16.  Beta hCG is negative.  Urinalysis is

negative.



IMAGING DATA:  MRI of the head is negative.  CAT scan of the head is

negative.  Chest x-ray reported to be clear.  Dr. Gallo's note is reviewed.



ASSESSMENT ADN PLAN:  This is a 37-year-old female with morbid obesity and

spinal stenosis had been admitted with weakness and ataxia.  Full neurology

exam has discussed with Dr. Talat Roach and Dr. Mike Roach the

Neurologist on the case.  The etiology of ataxia is unclear.  The patient

has macrocytic anemia with MCV of 105 with denying history of any

alcoholism, had a folate and B12 level in [place], which were normal. 

We will order a HIV test, C-reactive protein, and sedimentation rate of 40.

We would recommend vasculitis workup ASHKAN, and lupus workup.  We will

ordered an RPR and FTA, HIV PCR line.  We will order a methylmalonic acid

for better evaluation of the folate level.  We will order Whipple's DNA and

anti-smith antibody and double stranded DNA for lupus and we will order a

flow cytometry of blood, must rule out myelodysplastic syndrome cause of

macrocytic anemia and the patient has normal folate and normal B12 levels. 

MRI of the spine is scheduled.  We would also recommend an

electroencephalogram and spinal tap.  No need for antibiotics at this

point.  No evidence of any active infection.  We will also order blood

cultures and will follow closely with you.  Case discussed with residents,

Dr. Roach and Dr. Gallo.







__________________________________________

Adan Enriquez MD





DD:  12/23/2017 16:07:46

DT:  12/23/2017 23:01:35

Job # [id number]

## 2017-12-25 LAB
ALBUMIN/GLOB SERPL: 0.8 {RATIO} (ref 1.1–1.8)
ALP SERPL-CCNC: 76 U/L (ref 38–126)
ALT SERPL-CCNC: 32 U/L (ref 7–56)
AST SERPL-CCNC: 25 U/L (ref 14–36)
BASOPHILS # BLD AUTO: 0.04 K/MM3 (ref 0–2)
BASOPHILS NFR BLD: 0.8 % (ref 0–3)
BILIRUB SERPL-MCNC: 0.4 MG/DL (ref 0.2–1.3)
BUN SERPL-MCNC: 7 MG/DL (ref 7–21)
CALCIUM SERPL-MCNC: 9.3 MG/DL (ref 8.4–10.5)
CHLORIDE SERPL-SCNC: 106 MMOL/L (ref 98–107)
CO2 SERPL-SCNC: 21 MMOL/L (ref 21–33)
EOSINOPHIL # BLD: 0.2 10*3/UL (ref 0–0.7)
EOSINOPHIL NFR BLD: 3.1 % (ref 1.5–5)
ERYTHROCYTE [DISTWIDTH] IN BLOOD BY AUTOMATED COUNT: 13.8 % (ref 11.5–14.5)
GLOBULIN SER-MCNC: 4.7 GM/DL
GLUCOSE SERPL-MCNC: 91 MG/DL (ref 70–110)
GRANULOCYTES # BLD: 2.65 10*3/UL (ref 1.4–6.5)
GRANULOCYTES NFR BLD: 50.4 % (ref 50–68)
HCT VFR BLD CALC: 35.7 % (ref 36–48)
LYMPHOCYTES # BLD: 1.9 10*3/UL (ref 1.2–3.4)
LYMPHOCYTES NFR BLD AUTO: 36.3 % (ref 22–35)
MCH RBC QN AUTO: 34.8 PG (ref 25–35)
MCHC RBC AUTO-ENTMCNC: 33.9 G/DL (ref 31–37)
MCV RBC AUTO: 102.6 FL (ref 80–105)
MONOCYTES # BLD AUTO: 0.5 10*3/UL (ref 0.1–0.6)
MONOCYTES NFR BLD: 9.4 % (ref 1–6)
PLATELET # BLD: 386 10^3/UL (ref 120–450)
PMV BLD AUTO: 10.6 FL (ref 7–11)
POTASSIUM SERPL-SCNC: 3.9 MMOL/L (ref 3.6–5)
PROT SERPL-MCNC: 8.5 G/DL (ref 5.8–8.3)
SODIUM SERPL-SCNC: 140 MMOL/L (ref 132–148)
WBC # BLD AUTO: 5.2 10^3/UL (ref 4.5–11)

## 2017-12-25 RX ADMIN — IMMUNE GLOBULIN SCH MLS/MIN: 100 SOLUTION INTRAVENOUS at 11:56

## 2017-12-25 RX ADMIN — POLYETHYLENE GLYCOL 3350 SCH GM: 17 POWDER, FOR SOLUTION ORAL at 10:03

## 2017-12-25 RX ADMIN — POLYETHYLENE GLYCOL 3350 SCH GM: 17 POWDER, FOR SOLUTION ORAL at 17:30

## 2017-12-25 NOTE — CP.PCM.PN
<Lashanda Nicholson - Last Filed: 12/25/17 15:01>





Subjective





- Date & Time of Evaluation


Date of Evaluation: 12/25/17


Time of Evaluation: 15:01





- Subjective


Subjective: 





Lashanda Nicholson, PGY1, Medicine Progress Note for Dr. Watkins:





Patient seen and examined at bedside. No acute events overnight. Patient 

refused MRI spine yesterday as she said that she wanted to get IVIG one day and 

"will take one thing at a time." Agrees to MRI today. Complains of constipation 

for 1 week, and requests bowel regimen (pt already on colace tid). Her mother 

visited her last night.  





Objective





- Vital Signs/Intake and Output


Vital Signs (last 24 hours): 


 











Temp Pulse Resp BP Pulse Ox


 


 98.2 F   89   20   134/81   97 


 


 12/25/17 08:00  12/25/17 08:00  12/25/17 08:00  12/25/17 08:00  12/25/17 08:00








Intake and Output: 


 











 12/25/17 12/25/17





 06:59 18:59


 


Intake Total 240 


 


Output Total 450 


 


Balance -210 














- Medications


Medications: 


 Current Medications





Acetaminophen/Butalbital/Caffeine (Fioricet)  1 tab PO Q4H PRN


   PRN Reason: Headache


Docusate Sodium (Colace)  100 mg PO TID [facility]


   Last Admin: 12/25/17 14:36 Dose:  100 mg


Famotidine (Pepcid)  20 mg PO 1000,2200 [facility]


   Last Admin: 12/25/17 10:03 Dose:  20 mg


Heparin Sodium (Porcine) (Heparin)  5,000 units SC Q8 [facility]A


   PRN Reason: Protocol


   Last Admin: 12/25/17 14:36 Dose:  5,000 units


Immune Globulin 40 gm/ (Miscellaneous)  400 mls @ 1 mls/min IV DAILY [facility]


   Stop: 12/25/17 23:55


   Last Admin: 12/25/17 11:56 Dose:  1 mls/min


Ibuprofen (Motrin Tab)  600 mg PO Q6H PRN


   PRN Reason: Pain, Mild (1-3)


   Last Admin: 12/24/17 13:48 Dose:  600 mg


Polyethylene Glycol (Miralax)  17 gm PO BID [facility]


   Last Admin: 12/25/17 10:03 Dose:  17 gm











- Labs


Labs: 


 





 12/25/17 07:00 





 12/25/17 07:00 











- Constitutional


Appears: Non-toxic, No Acute Distress





- Head Exam


Head Exam: ATRAUMATIC, NORMOCEPHALIC





- Eye Exam


Eye Exam: EOMI, PERRL.  absent: Conjunctival injection, Scleral icterus


Pupil Exam: PERRL





- ENT Exam


ENT Exam: Mucous Membranes Moist





- Respiratory Exam


Respiratory Exam: Clear to Ausculation Bilateral.  absent: Accessory Muscle Use

, Chest Wall Tenderness, Respiratory Distress





- Cardiovascular Exam


Cardiovascular Exam: RRR, +S1, +S2.  absent: Murmur





- GI/Abdominal Exam


GI & Abdominal Exam: Soft, Normal Bowel Sounds.  absent: Distended, Guarding, 

Tenderness, Organomegaly, Rebound





- Extremities Exam


Extremities Exam: absent: Calf Tenderness, Pedal Edema





- Back Exam


Back Exam: NORMAL INSPECTION





- Neurological Exam


Neurological Exam: Alert, Awake, CN II-XII Intact, Oriented x3


Neuro motor strength exam: Left Upper Extremity: 5, Right Upper Extremity: 5, 

Left Lower Extremity: 5, Right Lower Extremity: 5


Additional comments: 





Sensation intact, propioception grossly intact b/l





- Psychiatric Exam


Psychiatric exam: Normal Affect, Normal Mood





- Skin


Skin Exam: Dry, Normal Color, Warm





Assessment and Plan





- Assessment and Plan (Free Text)


Assessment: 





36 yo F with no significant PMH who presents for back/body spasms, 

incoordination, ataxia (started on 12/8/17): 








Back/body spasms, Ataxia, Incoordination:


2/2 trauma related vs MS vs polymyositis vs Lyme disease vs Vit B12,D, folate 

deficiency vs hypothyroidism vs acute CVA vs other demyelinating condition (ALS

) vs MDS vs spine related neuropathy vs psychogenic


- Patient has inability to walk and has marked L finger to nose dysmetria; 

started 9 days ago


- Motor strength and sensation intact b/l.


- CXR unremarkable. CT head neg. MRI brain neg for any lesions.


- lumbar spine shows spinal canal stenosis L4-L5, no herniations.


- As per PT, pt was able to stand up, however, has leg coordination issues. 

recommend SALAZAR.


- Vitamin B12, folate and TSH all within normal limits.


- Received prior records from [facility] [place], MRI head was normal and Lumbar MRI showed 

congential narrowing of lumbar spinal canal. No other serologic workup done.


- Rpr NR, HIV ab neg. 


- ID consulted. appreciate recs. F/u lupus, antiphospholipid ab, Lyme disease, 

treponema serology, MMA, ASHKAN. 


- Heme consulted for macrocytic anemia. Pt not an alcoholic or found to be B12/

folate deficient. Consider MDS, f/u recs.


- High risk fall precautions


- Neurology consult requested, patient is well known to dr Roach from previous 

admission at [facility]. Pt refused spinal tap. will reconsider. 

Appreciate recs. 


- C/w IVIG Day 3/3. Cont to monitor any improvement of symptoms. 


- PT reeval 12/22 recommends [facility].


- Whole spine MRI with and w/o gadolinum ordered, pt refused today as pt is 

claustrophobic. Pt offered Valium, however pt does not want any medications. Pt 

states that she will reconsider getting MRI today.


- Cont to closely monitor patient's symptoms and serologic workup.


- Consider Psych eval if all serologies and workup negative.





Constipation:


- Last BM 1 week ago.


- On Colace tid since admission


- Benign abdominal exam, tolerating PO diet well


- Added Miralax to bowel regimen


- Cont to monitor.





Dispo: Pt came from [facility]. Amenable to go back there for PT.





GI/DVT Ppx - Pepcid, heparin





Patient seen, discussed, and reviewed with Dr Gallo.


Lashanda Nicholson, PGY1





<Cristel Watkins - Last Filed: 12/25/17 15:32>





Objective





- Vital Signs/Intake and Output


Vital Signs (last 24 hours): 


 











Temp Pulse Resp BP Pulse Ox


 


 98.2 F   89   20   134/81   97 


 


 12/25/17 08:00  12/25/17 08:00  12/25/17 08:00  12/25/17 08:00  12/25/17 08:00








Intake and Output: 


 











 12/25/17 12/25/17





 06:59 18:59


 


Intake Total 240 


 


Output Total 450 


 


Balance -210 














- Medications


Medications: 


 Current Medications





Acetaminophen/Butalbital/Caffeine (Fioricet)  1 tab PO Q4H PRN


   PRN Reason: Headache


Docusate Sodium (Colace)  100 mg PO TID [facility]


   Last Admin: 12/25/17 14:36 Dose:  100 mg


Famotidine (Pepcid)  20 mg PO 1000,2200 [facility]


   Last Admin: 12/25/17 10:03 Dose:  20 mg


Heparin Sodium (Porcine) (Heparin)  5,000 units SC Q8 [facility]


   PRN Reason: Protocol


   Last Admin: 12/25/17 14:36 Dose:  5,000 units


Immune Globulin 40 gm/ (Miscellaneous)  400 mls @ 1 mls/min IV DAILY [facility]


   Stop: 12/25/17 23:55


   Last Admin: 12/25/17 11:56 Dose:  1 mls/min


Ibuprofen (Motrin Tab)  600 mg PO Q6H PRN


   PRN Reason: Pain, Mild (1-3)


   Last Admin: 12/24/17 13:48 Dose:  600 mg


Polyethylene Glycol (Miralax)  17 gm PO BID [facility]


   Last Admin: 12/25/17 10:03 Dose:  17 gm











- Labs


Labs: 


 





 12/25/17 07:00 





 12/25/17 07:00 











Attending/Attestation





- Attestation


I have personally seen and examined this patient.: Yes


I have fully participated in the care of the patient.: Yes


I have reviewed all pertinent clinical information, including history, physical 

exam and plan: Yes


Notes (Text): 





12/25/17 15:27





Patient was seen and examined with medical resident. 


Agreed with resident assessment and plan.





37 yrs old Female was admitted with ataxia.She was previously worked up at [facility] [place] 

and then  was discharged to [facility] from where she was referred due to persistent 

symptoms.CT head and MRI brain were negative for acute findings.  CT spine 

showed L4-L5 spinal canal stenosis.





Patient has normal power in both upper and lower extremities.There is no 

sensory lose.Patient  Neurology has recommended LP and MRI spine.Patient has 

refused LP getting  IVIG as per Neurology.We will follow up MRI.


Continue Physical therapy.





Management plan was discussed in detail with patient


Education was provided.

## 2017-12-25 NOTE — PN
DATE:  12/25/2017



SUBJECTIVE:  The patient is in bed this morning, was seen early this

morning in room 569.



PHYSICAL EXAMINATION:

VITAL SIGNS:  Temperature is 98, blood pressure is 118/60, respiratory rate

of 20, heart rate of 98.

HEENT:  Unremarkable.

NECK:  Supple.

LUNGS:  Decreased breath sounds.

HEART:  Normal S1 and S2.

ABDOMEN:  Soft and nontender.



LABORATORY EXAMINATION:  Reveals white count of 5.2, hemoglobin of 12,

platelets of 386.  Chemistries are noted.  Serology is noted.  Blood

cultures have no growth.  Currently, the patient is off of antibiotics.



ASSESSMENT AND PLAN:  A 37-year-old female with morbid obesity, BMI of 42,

is admitted with ataxia and macrocytic anemia with an MCV of 105 with

normal B12 level.  We are awaiting for _____ folic acid deficiency and also

Oncology evaluation for myelodysplastic syndrome.  MRI of spine is also

pending.  Currently, off of antibiotics.







__________________________________________

Adan Enriquez MD





DD:  12/25/2017 18:36:31

DT:  12/25/2017 20:19:18

Job # [id number]

## 2017-12-26 LAB
ALBUMIN/GLOB SERPL: 0.8 {RATIO} (ref 1.1–1.8)
ALP SERPL-CCNC: 73 U/L (ref 38–126)
ALT SERPL-CCNC: 40 U/L (ref 7–56)
AST SERPL-CCNC: 31 U/L (ref 14–36)
BASOPHILS # BLD AUTO: 0.04 K/MM3 (ref 0–2)
BASOPHILS NFR BLD: 0.8 % (ref 0–3)
BILIRUB SERPL-MCNC: 0.4 MG/DL (ref 0.2–1.3)
BUN SERPL-MCNC: 8 MG/DL (ref 7–21)
CALCIUM SERPL-MCNC: 9.4 MG/DL (ref 8.4–10.5)
CHLORIDE SERPL-SCNC: 104 MMOL/L (ref 98–107)
CO2 SERPL-SCNC: 26 MMOL/L (ref 21–33)
EOSINOPHIL # BLD: 0.2 10*3/UL (ref 0–0.7)
EOSINOPHIL NFR BLD: 3.2 % (ref 1.5–5)
ERYTHROCYTE [DISTWIDTH] IN BLOOD BY AUTOMATED COUNT: 13.7 % (ref 11.5–14.5)
GLOBULIN SER-MCNC: 4.7 GM/DL
GLUCOSE SERPL-MCNC: 84 MG/DL (ref 70–110)
GRANULOCYTES # BLD: 2.33 10*3/UL (ref 1.4–6.5)
GRANULOCYTES NFR BLD: 47 % (ref 50–68)
HCT VFR BLD CALC: 35.5 % (ref 36–48)
LYMPHOCYTES # BLD: 1.7 10*3/UL (ref 1.2–3.4)
LYMPHOCYTES NFR BLD AUTO: 34.1 % (ref 22–35)
MCH RBC QN AUTO: 34.3 PG (ref 25–35)
MCHC RBC AUTO-ENTMCNC: 33.2 G/DL (ref 31–37)
MCV RBC AUTO: 103.2 FL (ref 80–105)
MONOCYTES # BLD AUTO: 0.7 10*3/UL (ref 0.1–0.6)
MONOCYTES NFR BLD: 14.9 % (ref 1–6)
PLATELET # BLD: 332 10^3/UL (ref 120–450)
PMV BLD AUTO: 10.5 FL (ref 7–11)
POTASSIUM SERPL-SCNC: 4.3 MMOL/L (ref 3.6–5)
PROT SERPL-MCNC: 8.5 G/DL (ref 5.8–8.3)
SODIUM SERPL-SCNC: 138 MMOL/L (ref 132–148)
WBC # BLD AUTO: 5 10^3/UL (ref 4.5–11)

## 2017-12-26 RX ADMIN — POLYETHYLENE GLYCOL 3350 SCH GM: 17 POWDER, FOR SOLUTION ORAL at 10:03

## 2017-12-26 NOTE — CP.PCM.PN
<Lashanda Nicholson - Last Filed: 12/26/17 16:17>





Subjective





- Date & Time of Evaluation


Date of Evaluation: 12/26/17


Time of Evaluation: 13:12





- Subjective


Subjective: 





Lashanda Nicholson, PGY1, Medicine Progress Note for Dr Watkins:





Patient seen and examined at bedside. Patient refused Pepcid and subq heparin 

last night. This AM, patient states that she feels sore, denies current muscle 

spasms, weakness. Patient states that she gets muscle spasms thru the day 

though. Agreed to Flexeril use now (had previously refused thru the hospital 

course). Still has ataxia. Denies any improvement of symptoms s/p 3 days of 

IVIG. After explaining to the patient about the importance of spine MRI, 

patient agreed to have it today. 





Objective





- Vital Signs/Intake and Output


Vital Signs (last 24 hours): 


 











Temp Pulse Resp BP Pulse Ox


 


 98.2 F   90   20   132/70   97 


 


 12/26/17 07:30  12/26/17 07:30  12/26/17 07:30  12/26/17 07:30  12/26/17 07:30








Intake and Output: 


 











 12/26/17 12/26/17





 06:59 18:59


 


Intake Total 900 


 


Balance 900 














- Medications


Medications: 


 Current Medications





Acetaminophen/Butalbital/Caffeine (Fioricet)  1 tab PO Q4H PRN


   PRN Reason: Headache


Cyclobenzaprine HCl (Flexeril)  5 mg PO TID PRN


   PRN Reason: Muscle spasm


Docusate Sodium (Colace)  100 mg PO TID Formerly [facility], [facility]–1983


   Last Admin: 12/26/17 10:03 Dose:  100 mg


Famotidine (Pepcid)  20 mg PO 1000,2200 Former[facility], [facility]


   Last Admin: 12/26/17 10:04 Dose:  Not Given


Heparin Sodium (Porcine) (Heparin)  5,000 units SC Q8 PURA


   PRN Reason: Protocol


   Last Admin: 12/26/17 06:37 Dose:  5,000 units


Ibuprofen (Motrin Tab)  600 mg PO Q6H PRN


   PRN Reason: Pain, Mild (1-3)


   Last Admin: 12/25/17 21:07 Dose:  600 mg


Polyethylene Glycol (Miralax)  17 gm PO BID [facility], [facility]


   Last Admin: 12/26/17 10:03 Dose:  17 gm











- Labs


Labs: 


 





 12/26/17 07:00 





 12/26/17 07:00 











- Additional Findings


Additional findings: 





- Constitutional


Appears: Non-toxic, No Acute Distress





- Head Exam


Head Exam: ATRAUMATIC, NORMOCEPHALIC





- Eye Exam


Eye Exam: EOMI, PERRL.  absent: Conjunctival injection, Scleral icterus


Pupil Exam: PERRL





- ENT Exam


ENT Exam: Mucous Membranes Moist





- Respiratory Exam


Respiratory Exam: Clear to Ausculation Bilateral.  absent: Accessory Muscle Use

, Chest Wall Tenderness, Respiratory Distress





- Cardiovascular Exam


Cardiovascular Exam: RRR, +S1, +S2.  absent: Murmur





- GI/Abdominal Exam


GI & Abdominal Exam: Soft, Normal Bowel Sounds.  absent: Distended, Guarding, 

Tenderness, Organomegaly, Rebound





- Extremities Exam


Extremities Exam: absent: Calf Tenderness, Pedal Edema





- Back Exam


Back Exam: NORMAL INSPECTION





- Neurological Exam


Neurological Exam: Alert, Awake, CN II-XII Intact, Oriented x3


Neuro motor strength exam: Left Upper Extremity: 5, Right Upper Extremity: 5, 

Left Lower Extremity: 5, Right Lower Extremity: 5


Additional comments: 


+ difficulty grasping cup (abnormal), incoordination movements of her fingers


Sensation intact, propioception grossly intact b/l





- Psychiatric Exam


Psychiatric exam: Normal Affect, Normal Mood





- Skin


Skin Exam: Dry, Normal Color, Warm





Assessment and Plan





- Assessment and Plan (Free Text)


Assessment: 





38 yo F with no significant PMH who presents for back/body spasms, 

incoordination, ataxia (started on 12/8/17): 








Back/body spasms, Ataxia, Incoordination:


2/2 trauma related vs MS vs polymyositis vs Lyme disease vs Vit B12,D, folate 

deficiency vs hypothyroidism vs acute CVA vs other demyelinating condition (ALS

) vs MDS vs spine related neuropathy vs psychogenic


- Her symptoms of acute ataxia and grasping difficulties/incoordination started 

acutely on 12/8/17 days ago


Patient has inability to walk and has marked L finger to nose dysmetria


- Motor strength and sensation intact b/l.


- CXR unremarkable. CT head neg. MRI brain neg for any lesions.


- lumbar spine shows spinal canal stenosis L4-L5, no herniations.


- As per PT, pt was able to stand up, however, has leg coordination issues. 

recommend SALAZAR.


- Vitamin B12, folate and TSH all within normal limits.


- Received prior records from [facility] [place], MRI head was normal and Lumbar MRI showed 

congential narrowing of lumbar spinal canal. No other serologic workup done.


- Rpr NR, HIV ab neg. ESR 40, CRP nrml.


- ID consulted. appreciate recs. F/u lupus, antiphospholipid ab, Lyme disease, 

treponema serology, MMA, ASHKAN. 


- Heme consulted for macrocytic anemia. Pt not an alcoholic or found to be B12/

folate deficient. Consider MDS, f/u recs.


- High risk fall precautions


- Neurology consult requested, patient is well known to dr Roach from previous 

admission at [facility]. Pt refused spinal tap. will reconsider. 

Appreciate recs. 


- S/p IVIG Day 3/3. Cont to monitor any improvement of symptoms. 


- PT reeval 12/22 recommends SALAZAR.


- Whole spine MRI with and w/o gadolinum ordered, pt refused x2 the prior days 

as pt is claustrophobic. Pt offered Valium, however pt does not want any 

medications. Pt states that she will reconsider getting MRI today.


- Spoke with pt's mother (over the phone) regarding patient's condition. She 

states that pt lives with her grandmother (who needs help from patient). Pt 

went to [employer] and received her Bachelors in 

Communications and Psychology from [employer]. Then worked as 

a  for a while, however not working currently. She says that she 

might be depressed. Does not know much about patient's social life/friends or 

partner/boyfriend. States that she is not aware of any current social issues 

going on in patient's life (as she herself is busy with taking care of her 

father at [facility]). States that pt fractured one of her wrist while 

she was 12, and it has been weak. But she has not noted the grasping 

difficulties/incoordinating movements with her fingers and ataxia that have 

recently developed. Prior to 12/8, pt complained of some back pain/muscle 

spasms but no other issues. On 12/8 she acute could not walk while she was at 

her grandmother's house (mother was not present at the time). Then ambulance 

had to be called to take her to [facility] [place]. Mother will try to obtain more 

information about patient's social life and communicate to the team. 


- Cont to closely monitor patient's symptoms and serologic workup.


- If pt refuses MRI, will send her back to rehab. Discussed with patient.





Constipation, resolved:


- Pt had a BM yesterday.


- Cont with Colace tid. Change Miralax once a day


- Cont to monitor.





Dispo: Pt came from [facility]. Amenable to go back there for PT.





GI/DVT Ppx - Pepcid, heparin





Patient seen, discussed, and reviewed with Dr Watkins.


Lashanda Nicholson, PGY1





<Cristel Watkins - Last Filed: 12/27/17 17:41>





Objective





- Vital Signs/Intake and Output


Vital Signs (last 24 hours): 


 











Temp Pulse Resp BP Pulse Ox


 


 98.2 F   86   20   114/73   95 


 


 12/27/17 08:38  12/27/17 08:38  12/27/17 08:38  12/27/17 08:38  12/27/17 08:38








Intake and Output: 


 











 12/27/17 12/27/17





 06:59 18:59


 


Intake Total 720 240


 


Balance 720 240














- Medications


Medications: 


 Current Medications





Acetaminophen/Butalbital/Caffeine (Fioricet)  1 tab PO Q4H PRN


   PRN Reason: Headache


Cyclobenzaprine HCl (Flexeril)  5 mg PO TID PRN


   PRN Reason: Muscle spasm


Docusate Sodium (Colace)  100 mg PO TID [facility], [facility]


   Last Admin: 12/27/17 15:28 Dose:  100 mg


Famotidine (Pepcid)  20 mg PO 1000,2200 [facility], [facility]


   Last Admin: 12/27/17 09:14 Dose:  Not Given


Heparin Sodium (Porcine) (Heparin)  5,000 units SC Q8 [facility], [facility]


   PRN Reason: Protocol


   Last Admin: 12/27/17 15:29 Dose:  5,000 units


Ibuprofen (Motrin Tab)  600 mg PO Q6H PRN


   PRN Reason: Pain, Mild (1-3)


   Last Admin: 12/27/17 09:10 Dose:  600 mg


Polyethylene Glycol (Miralax)  17 gm PO DAILY [facility], [facility]











- Labs


Labs: 


 





 12/26/17 07:00 





 12/26/17 07:00 











Attending/Attestation





- Attestation


I have personally seen and examined this patient.: Yes


I have fully participated in the care of the patient.: Yes


I have reviewed all pertinent clinical information, including history, physical 

exam and plan: Yes


Notes (Text): 





12/27/17 17:40


Patient was seen and examined with medical resident. 


Agreed with resident assessment and plan.





37 yrs old Female was admitted with ataxia.She was previously worked up at [facility] [place] 

and then  was discharged to [facility] from where she was referred due to persistent 

symptoms.CT head and MRI brain were negative for acute findings.  CT spine 

showed L4-L5 spinal canal stenosis.





Patient has normal power in both upper and lower extremities.There is no 

sensory lose.Patient  Neurology has recommended LP and MRI spine.Patient has 

refused LP.Patient was given IVIG for possible MS.The Neuro examination is 

unchanged.MRI of spine are pending.We will follow up results.





Management plan was discussed in detail with patient


Education was provided.

## 2017-12-26 NOTE — PN
DATE:  12/26/2017



SUBJECTIVE:  Patient is in bed in no acute distress, was seen early this

morning.



PHYSICAL EXAMINATION

VITAL SIGNS:  Temperature 98, blood pressure 130/70, respiratory rate 16.

HEENT:  Unremarkable.

NECK:  Supple.

LUNGS:  Decreased breath sounds.

HEART:  Normal S1, S2.

ABDOMEN:  Soft.



LABORATORY DATA:  Reveals a white count is 5, hemoglobin is 11, platelets

of 332.  Chemistries are noted.  Urinalysis is noted.  HIV is negative. 

RPR is negative.  Flow cytometry is pending.  Review of orders revealed the

patient to be off of antibiotics.  MRI of the spine is still pending. 

Spinal tap is still pending.  Dr. Nicholson's note is reviewed.



ASSESSMENT AND PLAN:  This is a 37-year-old female with morbid obesity,

with BMI of 42, with _____ macrocytic anemia with an MCV of 105 and

awaiting for B12 level as reported to be normal and methylmalonic acid

level is pending and vasculitis workup pending.  Hematology evaluation for

increased MCV of 105, myelodysplastic disorder must be in progress.  We

will follow with you.







__________________________________________

Adan Enriquez MD





DD:  12/26/2017 16:00:37

DT:  12/26/2017 18:49:03

Job # [id number]

## 2017-12-27 LAB
B2 GLYCOPROT1 IGA SER-ACNC: <9 SAU (ref ?–20)
B2 GLYCOPROT1 IGG SER-ACNC: <9 SGU (ref ?–20)
B2 GLYCOPROT1 IGM SER-ACNC: <9 SMU (ref ?–20)
CARDIOLIPIN IGA SER IA-ACNC: <11 APL (ref ?–11)
PS IGA SER-ACNC: <20 U/ML (ref ?–20)
PS IGM SER-ACNC: <25 U/ML (ref ?–25)
RETICS/RBC NFR: 1.36 % (ref 0.5–1.5)

## 2017-12-27 NOTE — MRI
PROCEDURE:  MR THORACIC SPINE WITHOUT CONTRAST



HISTORY:

acute inability to walk, body spasms



COMPARISON:

None available. 



TECHNIQUE:

Multiecho multiplanar sequences were performed through the thoracic 

spine without the use of intravenous contrast.



THE STUDY WAS EXTREMELY LIMITED.  THERE WAS METALLIC ARTIFACT OVER 

THE SPINE.  THE PATIENT WAS COMBATIVE. 



FINDINGS:



ALIGNMENT:

Metallic artifact distorts the spine



VERTEBRA:

There is a compression deformity of the left side of T10.  This does 

not appear to be acute.  There is no marrow edema



MARROW:

Marrow signal unremarkable.



PARASPINAL SOFT TISSUES:

Unremarkable.



CORD:

Unremarkable thoracic cord. No volume loss, signal abnormality or 

syrinx.



DISCS:

No disc herniation, spinal canal stenosis, or neuroforaminal 

narrowing.



OTHER FINDINGS:

None.



IMPRESSION:

Limited study.  No evidence of cord compression.

## 2017-12-27 NOTE — CP.PCM.DIS
<Lashanda Nicholson - Last Filed: 12/27/17 16:26>





Provider





- Provider


Date of Admission: 


12/22/17 14:02





Attending physician: 


Cristel Watkins MD





Primary care physician: 


Jamel Cloud MD





Consults: 





Neuro Doc Enriquez


Time Spent in preparation of Discharge (in minutes): 35





Diagnosis





- Discharge Diagnosis


(1) Cervical disc herniation


Status: Acute   





(2) Low back pain


Status: Acute   





(3) Spinal stenosis


Status: Acute   





Hospital Course





- Lab Results


Lab Results: 


 Micro Results





12/23/17 22:45   Blood   Blood Culture - Preliminary


                            NO GROWTH AFTER 3 DAYS


12/23/17 22:30   Blood   Blood Culture - Preliminary


                            NO GROWTH AFTER 3 DAYS





 Most Recent Lab Values











WBC  5.0 10^3/ul (4.5-11.0)   12/26/17  07:00    


 


RBC  3.44 10^6/uL (3.5-6.1)  L  12/26/17  07:00    


 


Hgb  11.8 g/dL (12.0-16.0)  L  12/26/17  07:00    


 


Hct  35.5 % (36.0-48.0)  L  12/26/17  07:00    


 


MCV  103.2 fl (80.0-105.0)   12/26/17  07:00    


 


MCH  34.3 pg (25.0-35.0)   12/26/17  07:00    


 


MCHC  33.2 g/dl (31.0-37.0)   12/26/17  07:00    


 


RDW  13.7 % (11.5-14.5)   12/26/17  07:00    


 


Plt Count  332 10^3/uL (120.0-450.0)   12/26/17  07:00    


 


MPV  10.5 fl (7.0-11.0)   12/26/17  07:00    


 


Gran %  47.0 % (50.0-68.0)  L  12/26/17  07:00    


 


Lymph % (Auto)  34.1 % (22.0-35.0)   12/26/17  07:00    


 


Mono % (Auto)  14.9 % (1.0-6.0)  H  12/26/17  07:00    


 


Eos % (Auto)  3.2 % (1.5-5.0)   12/26/17  07:00    


 


Baso % (Auto)  0.8 % (0.0-3.0)   12/26/17  07:00    


 


Gran #  2.33  (1.4-6.5)   12/26/17  07:00    


 


Lymph #  1.7  (1.2-3.4)   12/26/17  07:00    


 


Mono #  0.7  (0.1-0.6)  H  12/26/17  07:00    


 


Eos #  0.2  (0.0-0.7)   12/26/17  07:00    


 


Baso #  0.04 K/mm3 (0.0-2.0)   12/26/17  07:00    


 


ESR  70 mm/hr (0.0-20.0)  H  12/26/17  07:30    


 


Retic Count  1.36 % (0.5-1.5)   12/27/17  13:45    


 


Sodium  138 mmol/L (132-148)   12/26/17  07:00    


 


Potassium  4.3 mmol/L (3.6-5.0)   12/26/17  07:00    


 


Chloride  104 mmol/L ()   12/26/17  07:00    


 


Carbon Dioxide  26 mmol/L (21-33)   12/26/17  07:00    


 


Anion Gap  11  (10-20)   12/26/17  07:00    


 


BUN  8 mg/dL (7-21)   12/26/17  07:00    


 


Creatinine  0.7 mg/dl (0.7-1.2)   12/26/17  07:00    


 


Est GFR ( Amer)  > 60   12/26/17  07:00    


 


Est GFR (Non-Af Amer)  > 60   12/26/17  07:00    


 


Random Glucose  84 mg/dL ()   12/26/17  07:00    


 


Calcium  9.4 mg/dL (8.4-10.5)   12/26/17  07:00    


 


Phosphorus  4.7 mg/dL (2.5-4.5)  H  12/22/17  06:30    


 


Magnesium  1.9 mg/dL (1.7-2.2)   12/22/17  06:30    


 


Total Bilirubin  0.4 mg/dL (0.2-1.3)   12/26/17  07:00    


 


AST  31 U/L (14-36)   12/26/17  07:00    


 


ALT  40 U/L (7-56)   12/26/17  07:00    


 


Alkaline Phosphatase  73 U/L ()   12/26/17  07:00    


 


Total Creatine Kinase  54 U/L ()   12/22/17  06:30    


 


C-React Prot High Sens  2.62 mg/L (1.00-3.00)   12/24/17  06:30    


 


Total Protein  8.5 g/dL (5.8-8.3)  H  12/26/17  07:00    


 


Albumin  3.8 g/dL (3.0-4.8)   12/26/17  07:00    


 


Globulin  4.7 gm/dL  12/26/17  07:00    


 


Albumin/Globulin Ratio  0.8  (1.1-1.8)  L  12/26/17  07:00    


 


Vitamin B12  545 pg/mL (239-931)   12/24/17  06:30    


 


Folate  11.0 ng/mL  12/21/17  06:20    


 


TSH 3rd Generation  1.16 mIU/mL (0.46-4.68)   12/21/17  08:00    


 


Beta HCG, Quant  < 2.39 mIU/mL (0-6.15)   12/20/17  20:40    


 


Urine Color  Yellow  (YELLOW)   12/21/17  06:45    


 


Urine Appearance  Clear  (CLEAR)   12/21/17  06:45    


 


Urine pH  6.0  (4.7-8.0)   12/21/17  06:45    


 


Ur Specific Gravity  1.025  (1.005-1.035)   12/21/17  06:45    


 


Urine Protein  Negative mg/dL (<30 mg/dL)   12/21/17  06:45    


 


Urine Glucose (UA)  Negative mg/dL (NEGATIVE)   12/21/17  06:45    


 


Urine Ketones  15 mg/dL (NEGATIVE)  H  12/21/17  06:45    


 


Urine Blood  Trace-intact  (NEGATIVE)  H  12/21/17  06:45    


 


Urine Nitrate  Negative  (NEGATIVE)   12/21/17  06:45    


 


Urine Bilirubin  Negative  (NEGATIVE)   12/21/17  06:45    


 


Urine Urobilinogen  0.2 E.U./dL (<1 E.U./dL)   12/21/17  06:45    


 


Ur Leukocyte Esterase  Trace Vickie/uL (NEGATIVE)  H  12/21/17  06:45    


 


Urine RBC  0 - 2 /hpf (0-2)   12/21/17  06:45    


 


Urine WBC  0 - 2 /hpf (0-6)   12/21/17  06:45    


 


Ur Epithelial Cells  4 - 5 /hpf (0-5)   12/21/17  06:45    


 


Urine Bacteria  Few  (NEG)   12/21/17  06:45    


 


ASHKAN Screen  Negative  (Negative)   12/24/17  06:30    


 


Sm (Carrillo) Antibody  <1.0 AI (<1.0)   12/24/17  06:30    


 


Anti-Smith Interpret  Negative  (Negative)   12/24/17  06:30    


 


SM/RNP Antibody  <1.0 AI (<1.0)   12/24/17  06:30    


 


Sm/RNP Antibody Interp  Negative  (Negative)   12/24/17  06:30    


 


Beta-2-Glycoprotein Ab  <9 KARTHIK (<=20)   12/24/17  06:30    


 


Beta-2 GPI IgG Ab  <9 SGU (<=20)   12/24/17  06:30    


 


Beta-2 GPI IgM Ab  <9 SMU (<=20)   12/24/17  06:30    


 


Phosphatidylserine IgG  <10 U/mL (<10)   12/24/17  06:30    


 


Phosphatidylserine IgA  <20 U/mL (<20)   12/24/17  06:30    


 


Phosphatidylserine IgM  <25 U/mL (<25)   12/24/17  06:30    


 


RPR  Nonreactive  (NONREACTIVE)   12/23/17  06:45    


 


T.pallidum Ab (FTA-ABS)  Nonreactive  (Nonreactive)   12/24/17  06:30    


 


HIV 1&2 Ag/Ab, 4th Gen  Nonreactive  (Nonreactive)   12/23/17  06:45    


 


WB Flow Cytometry  Reference test   12/24/17  06:00    


 


Blood Type  O POSITIVE   12/24/17  06:30    


 


Blood Type Confirm  O POSITIVE   12/24/17  07:45    


 


Antibody Screen  Negative   12/24/17  06:30    


 


BBK History Checked  No verified bt   12/24/17  06:30    














- Hospital Course


Hospital Course: 





38 yo F with PMH back pain, presents to the ER by ambulance brought from [facility] at [facility], complaining of acute inability to walk and hand/foot 

incoordiantion. It started 12/8/17, which was the last time she was able to 

walk. She was previously admitted to [facility] where she was evaluated Dr V Doc, 

MRI brain/CT head, xrays and mri spine were all negative.  She was discharged 

from [facility] about one week ago and sent to [facility] for physical therapy. 

She reports continued inability to walk or bear weight on her legs beyond a few 

seconds. She denies any headache, vision changes, numbness, parasthesias. She 

denies recent illness, travel, sick contacts, bug bites. She also denies any 

major life changes or new stressors. She is complaining of back spasms that 

radiate from her head all the way to her toes, which she has had before, though 

is occuring more frequently; she went to rehab for this a year ago. She denies 

any fever, chills, nausea, vomiting, diarrhea, chest pain, shortness of breath, 

abdominal pain, dysuria, hematuria, joint pains, confusion, depression/anxiety, 

trouble sleeping, heat/cold intolerance, urinary/bowel incontinence, saddle 

anaesthesia. In hospital, cbc, cmp, serologies for HIV, synphilis, 

antiphospholipid syndrome, and other II workup was negative. MRI spine showed 

multilevel cervical disc herniations and lumbar spine canal stenosis.  Lumbar 

spine with gadolinium contrast did not show any demyelinating lesions. She 

refused Lumbar puncture. She empirically received 3 days IVIG, however, with no 

improvement of her symptoms. She refused Flexeril initially for her muscle 

spasms, and used ibuprofen for her pain. PT janeal recommended SALAZAR, and is 

therefore transfered to [facility]. 





Discharge Exam





- Additional Findings


Additional findings: 





- Head Exam


Head Exam: ATRAUMATIC, NORMOCEPHALIC





- Eye Exam


Eye Exam: EOMI, PERRL.  absent: Conjunctival injection, Scleral icterus


Pupil Exam: PERRL





- ENT Exam


ENT Exam: Mucous Membranes Moist





- Respiratory Exam


Respiratory Exam: Clear to Ausculation Bilateral.  absent: Accessory Muscle Use

, Chest Wall Tenderness, Respiratory Distress





- Cardiovascular Exam


Cardiovascular Exam: RRR, +S1, +S2.  absent: Murmur





- GI/Abdominal Exam


GI & Abdominal Exam: Soft, Normal Bowel Sounds.  absent: Distended, Guarding, 

Tenderness, Organomegaly, Rebound





- Extremities Exam


Extremities Exam: absent: Calf Tenderness, Pedal Edema





- Back Exam


Back Exam: NORMAL INSPECTION





- Neurological Exam


Neurological Exam: Alert, Awake, CN II-XII Intact, Oriented x3


Neuro motor strength exam: Left Upper Extremity: 5, Right Upper Extremity: 5, 

Left Lower Extremity: 5, Right Lower Extremity: 5


Additional comments: 


difficult grasping and incoordinating movements of her fingers (unchanged from 

previous exams).


Sensation intact, propioception grossly intact b/l





- Psychiatric Exam


Psychiatric exam: Normal Affect, Normal Mood





- Skin


Skin Exam: Dry, Normal Color, Warm





Discharge Plan





- Follow Up Plan


Condition: STABLE


Disposition: REHAB FACILITY/REHAB UNIT


Patient education suggested?: Yes


Instructions:  Low Back Strain (GEN), Lumbar Spinal Stenosis (DC), Cervical 

Disc Herniation (DC), Lumbar Radiculopathy (GEN), Weakness (GEN), Back Pain (GEN

)


Additional Instructions: 


- You are going back to subacute rehab to continue with your physical therapy. 


- You can take Flexeril for muscle spasms, colace and miralax for constipation.


- Please return to the hospital if any concerns.


Referrals: 


Jamel Cloud MD [Primary Care Provider] - 





<Cristel Watkins - Last Filed: 12/27/17 17:46>





Provider





- Provider


Date of Admission: 


12/22/17 14:02





Attending physician: 


Cristel Watkins MD





Primary care physician: 


Jamel Cloud MD








Hospital Course





- Lab Results


Lab Results: 


 Micro Results





12/23/17 22:45   Blood   Blood Culture - Preliminary


                            NO GROWTH AFTER 3 DAYS


12/23/17 22:30   Blood   Blood Culture - Preliminary


                            NO GROWTH AFTER 3 DAYS





 Most Recent Lab Values











WBC  5.0 10^3/ul (4.5-11.0)   12/26/17  07:00    


 


RBC  3.44 10^6/uL (3.5-6.1)  L  12/26/17  07:00    


 


Hgb  11.8 g/dL (12.0-16.0)  L  12/26/17  07:00    


 


Hct  35.5 % (36.0-48.0)  L  12/26/17  07:00    


 


MCV  103.2 fl (80.0-105.0)   12/26/17  07:00    


 


MCH  34.3 pg (25.0-35.0)   12/26/17  07:00    


 


MCHC  33.2 g/dl (31.0-37.0)   12/26/17  07:00    


 


RDW  13.7 % (11.5-14.5)   12/26/17  07:00    


 


Plt Count  332 10^3/uL (120.0-450.0)   12/26/17  07:00    


 


MPV  10.5 fl (7.0-11.0)   12/26/17  07:00    


 


Gran %  47.0 % (50.0-68.0)  L  12/26/17  07:00    


 


Lymph % (Auto)  34.1 % (22.0-35.0)   12/26/17  07:00    


 


Mono % (Auto)  14.9 % (1.0-6.0)  H  12/26/17  07:00    


 


Eos % (Auto)  3.2 % (1.5-5.0)   12/26/17  07:00    


 


Baso % (Auto)  0.8 % (0.0-3.0)   12/26/17  07:00    


 


Gran #  2.33  (1.4-6.5)   12/26/17  07:00    


 


Lymph #  1.7  (1.2-3.4)   12/26/17  07:00    


 


Mono #  0.7  (0.1-0.6)  H  12/26/17  07:00    


 


Eos #  0.2  (0.0-0.7)   12/26/17  07:00    


 


Baso #  0.04 K/mm3 (0.0-2.0)   12/26/17  07:00    


 


ESR  70 mm/hr (0.0-20.0)  H  12/26/17  07:30    


 


Retic Count  1.36 % (0.5-1.5)   12/27/17  13:45    


 


Sodium  138 mmol/L (132-148)   12/26/17  07:00    


 


Potassium  4.3 mmol/L (3.6-5.0)   12/26/17  07:00    


 


Chloride  104 mmol/L ()   12/26/17  07:00    


 


Carbon Dioxide  26 mmol/L (21-33)   12/26/17  07:00    


 


Anion Gap  11  (10-20)   12/26/17  07:00    


 


BUN  8 mg/dL (7-21)   12/26/17  07:00    


 


Creatinine  0.7 mg/dl (0.7-1.2)   12/26/17  07:00    


 


Est GFR ( Amer)  > 60   12/26/17  07:00    


 


Est GFR (Non-Af Amer)  > 60   12/26/17  07:00    


 


Random Glucose  84 mg/dL ()   12/26/17  07:00    


 


Calcium  9.4 mg/dL (8.4-10.5)   12/26/17  07:00    


 


Phosphorus  4.7 mg/dL (2.5-4.5)  H  12/22/17  06:30    


 


Magnesium  1.9 mg/dL (1.7-2.2)   12/22/17  06:30    


 


Total Bilirubin  0.4 mg/dL (0.2-1.3)   12/26/17  07:00    


 


AST  31 U/L (14-36)   12/26/17  07:00    


 


ALT  40 U/L (7-56)   12/26/17  07:00    


 


Alkaline Phosphatase  73 U/L ()   12/26/17  07:00    


 


Total Creatine Kinase  54 U/L ()   12/22/17  06:30    


 


C-React Prot High Sens  2.62 mg/L (1.00-3.00)   12/24/17  06:30    


 


Total Protein  8.5 g/dL (5.8-8.3)  H  12/26/17  07:00    


 


Albumin  3.8 g/dL (3.0-4.8)   12/26/17  07:00    


 


Globulin  4.7 gm/dL  12/26/17  07:00    


 


Albumin/Globulin Ratio  0.8  (1.1-1.8)  L  12/26/17  07:00    


 


Aldolase  5.5 U/L (<=8.1)   12/24/17  06:30    


 


Vitamin B12  545 pg/mL (239-931)   12/24/17  06:30    


 


Folate  11.0 ng/mL  12/21/17  06:20    


 


TSH 3rd Generation  1.16 mIU/mL (0.46-4.68)   12/21/17  08:00    


 


Beta HCG, Quant  < 2.39 mIU/mL (0-6.15)   12/20/17  20:40    


 


Urine Color  Yellow  (YELLOW)   12/21/17  06:45    


 


Urine Appearance  Clear  (CLEAR)   12/21/17  06:45    


 


Urine pH  6.0  (4.7-8.0)   12/21/17  06:45    


 


Ur Specific Gravity  1.025  (1.005-1.035)   12/21/17  06:45    


 


Urine Protein  Negative mg/dL (<30 mg/dL)   12/21/17  06:45    


 


Urine Glucose (UA)  Negative mg/dL (NEGATIVE)   12/21/17  06:45    


 


Urine Ketones  15 mg/dL (NEGATIVE)  H  12/21/17  06:45    


 


Urine Blood  Trace-intact  (NEGATIVE)  H  12/21/17  06:45    


 


Urine Nitrate  Negative  (NEGATIVE)   12/21/17  06:45    


 


Urine Bilirubin  Negative  (NEGATIVE)   12/21/17  06:45    


 


Urine Urobilinogen  0.2 E.U./dL (<1 E.U./dL)   12/21/17  06:45    


 


Ur Leukocyte Esterase  Trace Vickie/uL (NEGATIVE)  H  12/21/17  06:45    


 


Urine RBC  0 - 2 /hpf (0-2)   12/21/17  06:45    


 


Urine WBC  0 - 2 /hpf (0-6)   12/21/17  06:45    


 


Ur Epithelial Cells  4 - 5 /hpf (0-5)   12/21/17  06:45    


 


Urine Bacteria  Few  (NEG)   12/21/17  06:45    


 


ASHKAN Screen  Negative  (Negative)   12/24/17  06:30    


 


Sm (Carrillo) Antibody  <1.0 AI (<1.0)   12/24/17  06:30    


 


Anti-Smith Interpret  Negative  (Negative)   12/24/17  06:30    


 


SM/RNP Antibody  <1.0 AI (<1.0)   12/24/17  06:30    


 


Sm/RNP Antibody Interp  Negative  (Negative)   12/24/17  06:30    


 


Beta-2-Glycoprotein Ab  <9 KARTHIK (<=20)   12/24/17  06:30    


 


Beta-2 GPI IgG Ab  <9 SGU (<=20)   12/24/17  06:30    


 


Beta-2 GPI IgM Ab  <9 SMU (<=20)   12/24/17  06:30    


 


Phosphatidylserine IgG  <10 U/mL (<10)   12/24/17  06:30    


 


Phosphatidylserine IgA  <20 U/mL (<20)   12/24/17  06:30    


 


Phosphatidylserine IgM  <25 U/mL (<25)   12/24/17  06:30    


 


RPR  Nonreactive  (NONREACTIVE)   12/23/17  06:45    


 


T.pallidum Ab (FTA-ABS)  Nonreactive  (Nonreactive)   12/24/17  06:30    


 


HIV 1&2 Ag/Ab, 4th Gen  Nonreactive  (Nonreactive)   12/23/17  06:45    


 


WB Flow Cytometry  Reference test   12/24/17  06:00    


 


Blood Type  O POSITIVE   12/24/17  06:30    


 


Blood Type Confirm  O POSITIVE   12/24/17  07:45    


 


Antibody Screen  Negative   12/24/17  06:30    


 


BBK History Checked  No verified bt   12/24/17  06:30    














Attending/Attestation





- Attestation


I have personally seen and examined this patient.: Yes


I have fully participated in the care of the patient.: Yes


I have reviewed all pertinent clinical information, including history, physical 

exam and plan: Yes


Notes (Text): 





12/27/17 17:46


Patient was seen and examined with medical resident. 


Agreed with resident assessment and plan.





37 yrs old Female was admitted with ataxia.She was previously worked up at [facility] 

and then  was discharged to [facility] from where she was referred due to persistent 

symptoms.CT head and MRI brain were negative for acute findings.  CT spine 

showed L4-L5 spinal canal stenosis.





Patient has normal power in both upper and lower extremities.There is no 

sensory lose.MRI of spines is negative for any demylination lesion or any other 

acute abnormalities.Patient case was discussed with Neurology.She will be 

discharged back to [facility] for physical therapy and rehabilitation.


Management plan was discussed in detail with patient


Education was provided.

## 2017-12-27 NOTE — MRI
EXAM:

  MR Lumbar Spine Without Intravenous Contrast



CLINICAL HISTORY:

  37 years old, female; Signs and symptoms; Weakness; Additional info: Acute 

inability to walk, body spasms



TECHNIQUE:

  Magnetic resonance images of the lumbar spine without intravenous contrast in 

multiple planes.



COMPARISON:

  CT - LUMBAR SPINE W/O CONTRAST 2017-12-20 22:57



FINDINGS:

  Vertebrae:  The lumbar vertebral bodies are normal in height and alignment.No 

acute fracture or dislocation is seen.

  Marrow:  There is a normal proportion of hematopoietic bone marrow and fat 

for this patient's age.There is no evidence of abnormal bone marrow signal 

intensity to suggest contusion or infection.

  Epidural space:  There is no evidence of epidural masses or hemorrhage.

  Spinal cord:  The conus medullaris is normal.

  Soft tissues:  The prevertebral soft tissues appear normal.



 DISCS/SPINAL CANAL/NEURAL FORAMINA:

  L1-L2:  No disc herniation, disc bulge, nor spinal canal or neural foraminal 

stenosis is present. There is no nerve root impingement.

  L2-L3:  No disc herniation, disc bulge, nor spinal canal or neural foraminal 

stenosis is present. There is no nerve root impingement.

  L3-L4:  No disc herniation, disc bulge, nor spinal canal or neural foraminal 

stenosis is present. There is no nerve root impingement.

  L4-L5:  The L4-L5 level demonstrates a mild diffuse posterior disc bulge. The 

facet joints demonstrate mild degenerative hypertrophy and sclerosis. There is 

mild spinal canal narrowing, with an AP canal dimension of 10 mm. There is mild 

bilateral foraminal stenosis.

  L5-S1:  The L5-S1 level demonstrates a mild diffuse posterior disc bulge. The 

facet joints demonstrate moderate degenerative narrowing and sclerosis. There 

is mild spinal canal narrowing, with an AP canal dimension of 10 mm. There is 

mild bilateral foraminal stenosis.

  Other findings:  The cauda equina nerve roots demonstrate no crowding or 

displacement.



IMPRESSION:     

  MRI of the lumbar spine reveals stable mild multilevel degenerative 

spondylitic changes and degenerative disc disease at L4-5 and L5-S1 levels as 

described above.No acute fracture or dislocation is seen.

## 2017-12-27 NOTE — MRI
PROCEDURE:  MR LUMBAR SPINE WITH  CONTRAST



HISTORY:

stenosis



COMPARISON:

Comparison was made to the nonenhanced study from the previous day. 



TECHNIQUE:

Multiecho multiplanar sequences were performed through the lumbar 

spine with and without the use of intravenous contrast.



FINDINGS:

Normal lumbar lordosis.



Vertebral body heights are preserved.



Marrow signal unremarkable.



Conus medullaris unremarkable at the level of 



Paraspinal soft tissues are unremarkable.



No abnormal enhancement.



T12-L1:

No disc herniation, spinal canal stenosis or neural foraminal 

narrowing. 



L1-2:

No disc herniation, spinal canal stenosis or neural foraminal 

narrowing. 



L2-3:

No disc herniation, spinal canal stenosis or neural foraminal 

narrowing. 



L3-4:

Moderate facet arthropathy 



L4-5:

Moderate facet arthropathy with mild central stenosis 



L5-S1:

No disc herniation, spinal canal stenosis or neural foraminal 

narrowing. Mild facet arthropathy 



OTHER FINDINGS:

None. 



IMPRESSION:

Facet arthropathy in the lower lumbar spine.  Mild stenosis at L4-5.



No enhancing abnormalities

## 2017-12-27 NOTE — CP.PCM.PN
<Lashanda Nicholson - Last Filed: 12/27/17 11:51>





Subjective





- Date & Time of Evaluation


Date of Evaluation: 12/27/17


Time of Evaluation: 11:52





- Subjective


Subjective: 





Lashanda Nicholson, PGY1, Medicine Progress Note for Dr. Watkins:





Patient seen and examined at bedside. No acute events overnight. Pt to undergo 

contrast lumbar MRI today. States that she would like to split up the time as 

she is claustrophobic (refused Valium).  No acute events overnight. Pt c/o 

muscle spasms intermittently, and still has ataxia. No changes. 





Objective





- Vital Signs/Intake and Output


Vital Signs (last 24 hours): 


 











Temp Pulse Resp BP Pulse Ox


 


 98.2 F   86   20   114/73   95 


 


 12/27/17 08:38  12/27/17 08:38  12/27/17 08:38  12/27/17 08:38  12/27/17 08:38








Intake and Output: 


 











 12/27/17 12/27/17





 06:59 18:59


 


Intake Total 720 


 


Balance 720 














- Medications


Medications: 


 Current Medications





Acetaminophen/Butalbital/Caffeine (Fioricet)  1 tab PO Q4H PRN


   PRN Reason: Headache


Cyclobenzaprine HCl (Flexeril)  5 mg PO TID PRN


   PRN Reason: Muscle spasm


Docusate Sodium (Colace)  100 mg PO TID [facility]


   Last Admin: 12/27/17 09:10 Dose:  100 mg


Famotidine (Pepcid)  20 mg PO 1000,2200 [facility]


   Last Admin: 12/27/17 09:14 Dose:  Not Given


Heparin Sodium (Porcine) (Heparin)  5,000 units SC Q8 [facility]


   PRN Reason: Protocol


   Last Admin: 12/27/17 05:33 Dose:  5,000 units


Ibuprofen (Motrin Tab)  600 mg PO Q6H PRN


   PRN Reason: Pain, Mild (1-3)


   Last Admin: 12/27/17 09:10 Dose:  600 mg


Polyethylene Glycol (Miralax)  17 gm PO DAILY [facility]











- Labs


Labs: 


 





 12/26/17 07:00 





 12/26/17 07:00 











- Additional Findings


Additional findings: 





- Constitutional


Appears: Non-toxic, No Acute Distress





- Head Exam


Head Exam: ATRAUMATIC, NORMOCEPHALIC





- Eye Exam


Eye Exam: EOMI, PERRL.  absent: Conjunctival injection, Scleral icterus


Pupil Exam: PERRL





- ENT Exam


ENT Exam: Mucous Membranes Moist





- Respiratory Exam


Respiratory Exam: Clear to Ausculation Bilateral.  absent: Accessory Muscle Use

, Chest Wall Tenderness, Respiratory Distress





- Cardiovascular Exam


Cardiovascular Exam: RRR, +S1, +S2.  absent: Murmur





- GI/Abdominal Exam


GI & Abdominal Exam: Soft, Normal Bowel Sounds.  absent: Distended, Guarding, 

Tenderness, Organomegaly, Rebound





- Extremities Exam


Extremities Exam: absent: Calf Tenderness, Pedal Edema





- Back Exam


Back Exam: NORMAL INSPECTION





- Neurological Exam


Neurological Exam: Alert, Awake, CN II-XII Intact, Oriented x3


Neuro motor strength exam: Left Upper Extremity: 5, Right Upper Extremity: 5, 

Left Lower Extremity: 5, Right Lower Extremity: 5


Additional comments: 


difficult grasping and incoordinating movements of her fingers (unchanged from 

previous exams).


Sensation intact, propioception grossly intact b/l





- Psychiatric Exam


Psychiatric exam: Normal Affect, Normal Mood





- Skin


Skin Exam: Dry, Normal Color, Warm





Assessment and Plan





- Assessment and Plan (Free Text)


Assessment: 








36 yo F with no significant PMH who presents for back/body spasms, 

incoordination, ataxia (started on 12/8/17): 








Back/body spasms, Ataxia, Incoordination:


2/2 trauma related vs MS vs polymyositis vs Lyme disease vs Vit B12,D, folate 

deficiency vs hypothyroidism vs acute CVA vs other demyelinating condition (ALS

) vs MDS vs spine related neuropathy vs psychogenic


- Her symptoms of acute ataxia and grasping difficulties/incoordination started 

acutely on 12/8/17 days ago


Patient has inability to walk and has marked L finger to nose dysmetria


- Motor strength and sensation intact b/l.


- CXR unremarkable. CT head neg. MRI brain neg for any lesions.


- lumbar spine shows spinal canal stenosis L4-L5, no herniations.


- As per PT, pt was able to stand up, however, has leg coordination issues. 

recommend SALAZAR.


- Vitamin B12, folate and TSH all within normal limits.


- [place] [facility] was normal and Lumbar MRI showed 

congential narrowing of lumbar spinal canal. No other serologic workup done.


- Rpr NR, HIV ab neg. ESR 40, CRP nrml.


- ID consulted. appreciate recs. F/u lupus, antiphospholipid ab, Lyme disease, 

treponema serology, MMA, ASHKAN. 


- Heme consulted for macrocytic anemia. Pt not an alcoholic or found to be B12/

folate deficient. Consider MDS, f/u recs.


- High risk fall precautions


- Neurology consult requested, patient is well known to dr Roach from previous 

admission at [facility]. Pt refused spinal tap. will reconsider. 

Appreciate recs. 


- S/p IVIG Day 3/3. No improvement of symptoms


- PT reeval 12/22, 12/26 recommends SALAZAR.


- Spoke with pt's mother (over the phone) regarding patient's condition. She 

states that pt lives with her grandmother (who needs help from patient). Pt 

went to [employer] and received her Bachelors in 

Communications and Psychology from [employer]. Then worked as 

a  for a while, however not working currently. She says that she 

might be depressed. Does not know much about patient's social life/friends or 

partner/boyfriend. States that she is not aware of any current social issues 

going on in patient's life (as she herself is busy with taking care of her 

father at [facility]). States that pt fractured one of her wrist while 

she was 12, and it has been weak. But she has not noted the grasping 

difficulties/incoordinating movements with her fingers and ataxia that have 

recently developed. Prior to 12/8, pt complained of some back pain/muscle 

spasms but no other issues. On 12/8 she acute could not walk while she was at 

her grandmother's house (mother was not present at the time). Then ambulance 

had to be called to take [place] [facility]. Mother will try to obtain more 

information about patient's social life and communicate to the team. 


- s/p 3 days of IVIG Day. No improvement of symptoms.


- PT reeval 12/22, 12/26 recommends SALAZAR.


- Cervical spine MRI showed disc herniations C3-C4, C4-C5, C5-C6, C6-C7.  

Multilevel degenerative spondylitic changes and degenerative joint disease.


- Thoracic MRI showed mild compression deformity of left side of T10. chronic


- Awaiting spine MRI with contrast to see any demyelinating lesions.


- ASHKAN neg. Anti-Sm ab neg, Phosphatidylserine Immunoglobulins neg.


- Cont to monitor. If MRI does not show any demyelinating lesions, will send 

her back to [facility] ([facility]





Constipation, resolved:


- Cont with Colace tid and Miralax once a day


- Cont to monitor.





Dispo: Pt came from [facility]. Amenable to go back there for PT.





GI/DVT Ppx - Pepcid, heparin





Patient seen, discussed, and reviewed with Dr Watkins.


Lashanda Nicholson, PGY1





<Cristel Watkins - Last Filed: 12/27/17 17:45>





Objective





- Vital Signs/Intake and Output


Vital Signs (last 24 hours): 


 











Temp Pulse Resp BP Pulse Ox


 


 98.2 F   86   20   114/73   95 


 


 12/27/17 08:38  12/27/17 08:38  12/27/17 08:38  12/27/17 08:38  12/27/17 08:38








Intake and Output: 


 











 12/27/17 12/27/17





 06:59 18:59


 


Intake Total 720 240


 


Balance 720 240














- Medications


Medications: 


 Current Medications





Acetaminophen/Butalbital/Caffeine (Fioricet)  1 tab PO Q4H PRN


   PRN Reason: Headache


Cyclobenzaprine HCl (Flexeril)  5 mg PO TID PRN


   PRN Reason: Muscle spasm


Docusate Sodium (Colace)  100 mg PO TID [facility]


   Last Admin: 12/27/17 15:28 Dose:  100 mg


Famotidine (Pepcid)  20 mg PO 1000,2200 [facility]


   Last Admin: 12/27/17 09:14 Dose:  Not Given


Heparin Sodium (Porcine) (Heparin)  5,000 units SC Q8 [facility]


   PRN Reason: Protocol


   Last Admin: 12/27/17 15:29 Dose:  5,000 units


Ibuprofen (Motrin Tab)  600 mg PO Q6H PRN


   PRN Reason: Pain, Mild (1-3)


   Last Admin: 12/27/17 09:10 Dose:  600 mg


Polyethylene Glycol (Miralax)  17 gm PO DAILY [facility]











- Labs


Labs: 


 





 12/26/17 07:00 





 12/26/17 07:00 











Attending/Attestation





- Attestation


I have personally seen and examined this patient.: Yes


I have fully participated in the care of the patient.: Yes


I have reviewed all pertinent clinical information, including history, physical 

exam and plan: Yes


Notes (Text): 





12/27/17 17:44


Patient was seen and examined with medical resident. 


Agreed with resident assessment and plan.





37 yrs old Female was admitted with ataxia.She was previously [place] [facility] 

and then  was discharged to [facility] from where she was referred due to persistent 

symptoms.CT head and MRI brain were negative for acute findings.  CT spine 

showed L4-L5 spinal canal stenosis.





Patient has normal power in both upper and lower extremities.There is no 

sensory lose.MRI of spines is negative for any demylination lesion or any other 

acute abnormalities.Patient case was discussed with Neurology.She will be 

discharged back to [facility] for physical therapy and rehabilitation.


Management plan was discussed in detail with patient


Education was provided.

## 2017-12-27 NOTE — MRI
EXAM:

  MR Cervical Spine Without Intravenous Contrast



CLINICAL HISTORY:

  37 years old, female; Signs and symptoms; Weakness; Additional info: Acute 

inability to walk, body spasms



TECHNIQUE:

  Magnetic resonance images of the cervical spine without intravenous contrast 

in multiple planes.



COMPARISON:

  No relevant prior studies available.



FINDINGS:

  Vertebrae:  The cervical vertebral bodies are normal height and alignment.No 

acute fracture or dislocation is seen.  The atlantoaxial articulation is normal.

  Marrow:  There is a normal proportion of hematopoietic bone marrow and fat 

for this patient's age.There is no evidence of abnormal bone marrow signal 

intensity to suggest contusion or infection.

  Epidural space:  There is no evidence of epidural masses or hemorrhage.

  Spinal cord:  The spinal cord is normal in thickness and signal 

intensity.There is no intramedullary signal abnormality.

  Soft tissues:  There is straightening of the cervical spine which could be 

secondary to positioning or muscle spasm.  The prevertebral soft tissues appear 

normal.



 DISCS/SPINAL CANAL/NEURAL FORAMINA:

  C2-C3:  Unremarkable.  No significant disc disease.  No stenosis.

  C3-C4:  The C3-C4 level demonstrates a small diffuse posterior disc 

herniation. The facet joints demonstrate mild degenerative hypertrophy and 

sclerosis. There is mild spinal canal narrowing, with an AP canal dimension of 

10 mm. There is mild bilateral foraminal stenosis.

  C4-C5:  The C4-C5 level demonstrates a small diffuse posterior disc 

herniation. The facet joints demonstrate mild degenerative hypertrophy and 

sclerosis. There is mild spinal canal narrowing, with an AP canal dimension of 

10 mm. There is mild bilateral foraminal stenosis.

  C5-C6:  The C5-C6 level demonstrates a small posterior central disc 

herniation.The facet joints demonstrate mild degenerative hypertrophy and 

sclerosis. There is bilateral uncovertebral hypertrophic changes.There is 

moderate spinal canal stenosis, with an AP canal dimension of 8 mm. There is 

mild indentation and compression of the spinal cord at this level.There is mild 

bilateral foraminal stenosis.

  C6-C7:  The C6-C7 level demonstrates a mild diffuse posterior bulge. The 

facet joints demonstrate mild degenerative hypertrophy and sclerosis. There is 

no evidence of spinal canal narrowing. There is mild bilateral foraminal 

stenosis.

  C7-T1:  Unremarkable.  No significant disc disease.  No stenosis.

  Other findings:  The visualized brain parenchyma is unremarkable.



IMPRESSION:     



  MRI of the cervical spine reveals mild multilevel degenerative spondylitic 

changes and degenerative disc disease , most significant at C5-6 level as 

described above.  No acute fracture or dislocation is seen.

## 2017-12-28 VITALS — RESPIRATION RATE: 20 BRPM

## 2017-12-28 VITALS
SYSTOLIC BLOOD PRESSURE: 154 MMHG | TEMPERATURE: 98.9 F | OXYGEN SATURATION: 99 % | HEART RATE: 66 BPM | DIASTOLIC BLOOD PRESSURE: 94 MMHG

## 2017-12-28 LAB
ERYTHROCYTE [DISTWIDTH] IN BLOOD BY AUTOMATED COUNT: 16.2 % (ref 11–15)
HCT VFR BLD CALC: 39.2 % (ref 35–45)
HGB BLD-MCNC: 12.8 G/DL (ref 11.7–15.5)

## 2017-12-28 NOTE — PN
DATE:  12/28/2017



SUBJECTIVE:  The patient is in bed, in no acute distress, nontoxic.



PHYSICAL EXAMINATION

HEENT:  Unremarkable.

NECK:  Supple.

LUNGS:  Have decreased breath sounds.

HEART:  Normal S1, S2.

ABDOMINAL:  Soft.



LABORATORY DATA:  Noted and the patient's hemoglobin is 11 with MCV of 103.

Microbiology reveals the cultures are negative.



ASSESSMENT AND PLAN:   This is a 37-year-old female was seen earlier today

with morbid obesity, BMI of 42 with a macrocytic anemia with MCV of 105,

yesterday is 103 with questionable ataxia.  Currently off of antibiotics

and Neurology to follow the patient for the ataxia.  MRI of the head is

noted to be negative.  The patient is not had a spinal tap and we will

follow with you.  No evidence of an active infection at this time.





__________________________________________

Adan Enriquez MD



DD:  12/28/2017 16:17:13

DT:  12/28/2017 16:19:18

Job # [id number]

## 2017-12-28 NOTE — CON
HEMATOLOGY CONSULTATION



DATE:



HISTORY OF PRESENT ILLNESS:  This is a 37-year-old woman, who is admitted

for back pain and has extensive evaluation for this back pain radiating

down to her legs.  They asked me to see her for Hematology evaluation, and

I saw the patient at the bedside with mother.



PHYSICAL EXAMINATION:

SKIN:  No petechiae.  No bruises.

HEENT:  Anicteric.

NODES:  Nonpalpable in axillary, cervical, supraclavicular, or inguinal

regions.

LUNGS:  Clear at present.  No vertebral tenderness.

HEART:  S1 and S2.

ABDOMEN:  Shows no liver, no spleen, no tenderness.

EXTREMITIES:  No edema.

CNS:  The plantars are downgoing bilaterally, although she has difficulty

moving her legs, I suspect because of the pain.



Hematologically, there are 2 issues here:

1.  The hemoglobin is fluctuating between here 11.8 and 12.4, but the MCV

has been about 102 to 104 with an MCH about 34 to 35.  The issues here are

why she has the elevated MCV.

1.  Rule out B12 deficiency, B12 is 545.

2.  Reticulocyte count is elevated.  Reticulocyte count is 1.3, which is

unclear why she has this.  She could have hemoglobinopathy with

erythrocytosis.  The hemoglobin electrophoresis is pending.  When I look at

a peripheral smear, I could not see polychromasia or increased

microspherocytes or schistocytes.  So, at this point, we are going to

observe this and await further test that was sent.



2.  The patient has white count of about 5 and her monocyte count has been

going from 8.7 to 14.9 and her leukocyte count 41% to 34% with a total of

about 50% of the leukocytes and monocytes.  Again on peripheral smear, I do

not see abnormal increased leukocytes, no smudge cells in the monocytes or

there were no Dacia cells.   We sent off a flow cytometry to make sure

these cells are normal and that has not returned back yet.  Both of these

issues are, I do not think, relating to her issue which is the back pain,

and I told the mother and the patient when the results come, we would try

to let her primary care doctor know.





__________________________________________

Miko Centeno MD



DD:  12/28/2017 8:17:55

DT:  12/28/2017 10:04:56

Job # [id number]

## 2017-12-28 NOTE — PN
DATE:  12/27/2017



SUBJECTIVE:  The patient was seen early this morning, and no fevers and no

chills.



PHYSICAL EXAMINATION:

VITAL SIGNS:  Temperature is 98, blood pressure is 120/70, respiratory rate

of 16.

HEENT:  Unremarkable.

NECK:  Supple.

LUNGS:  Have decreased breath sounds.

HEART:  Normal S1, S2.

ABDOMEN:  Soft and nontender.



LABORATORY EXAMINATION:  Reveals the patient has a white count of 5, sed

rate is 70, and hemoglobin of 11.  MCV of 103.  Chemistry is noted.  TSH

level is noted at 1.16.  Urinalysis is noted.  ASHKAN screen is negative. 

Anti-Smith antibody is negative and Smith-RNP antibody is negative.  The

patient's HIV is negative.  PCR is negative, and RPR and FTA are negative. 

Flow cytometry is pending.  The blood cultures are negative.



Review of orders revealed the patient not to be on antibiotics.  The

patient had lumbar spine MRI; mild stenosis of L4 and L5, facet arthropathy

of lower lumbar spine.  The patient had peripheral blood, no evidence of

increasing black cells, however, abnormal myeloid maturation is present. 

No evidence of clonal B cells or abnormal T cell population.



ASSESSMENT AND PLAN:  A 37-year-old female; morbid obesity, BMI of 42;

macrocytic anemia, MCV of 105 with ataxia, currently off of antibiotics. 

The patient has refused the spinal tap and _____.







__________________________________________

Adan Enriquez MD





DD:  12/27/2017 22:15:07

DT:  12/27/2017 23:03:52

Job # [id number]

## 2017-12-29 LAB — HGB F MFR BLD: <1 PERCENT (ref ?–2)

## 2017-12-29 NOTE — CON
DATE:  12/28/2017



She is being seen today in consultation.



PRESENTATION:  The patient was seen at bedside.  She is an 

female, who is obese.  The patient is pleasant when encountered.  She

indicates that she came from [facility] because she was falling

down and they wanted to evaluate whether or not the situation that brought

her in there was worsening.  The history is that she evidently fell in

11/2017, fell backwards and hit her head and started to have problems

standing and walking a period of time after that and by 12/08/2017, was

unable to weight bear all the time.  So she has been under treatment for

this.  She is in the rehabilitation portion of the [facility]. 

Psychiatric consult was called.  She see whether there was any psychiatric

disturbance going on with this patient, which may be contributing to her

medical condition.  The patient indicates that she was alone.  She has

never been , has no children.  Has a big support system.  She has a

lot of friends and her parents are alive.  She has a brother and a sister

with them.  She is not close.  She indicates that she had happy childhood

and she grow up in [place].  She went to [employer] and

completed degree in Psychology.  She has worked in the past as substance

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

her job.  She denies any use of drugs or alcohol.  She denies any

psychiatry history in terms of seeing a psychiatrist, being hospitalized,

having any suicidal thoughts or inclinations.  She initial states she has

never been on any psychiatric medication, but it seems that one of the

doctor's when she was trying to find out what is going on with her

physically with her walking difficulties had put her on gabapentin and

Risperdal, both of which were discontinued, but she had some what sounds

like some involuntary movements following going off of those medications. 

She does not think that there is any reason for her to be in therapy or to

have any psychiatric treatment at this time.  She is being discharged and

returning to [facility].  She feels frustrated that there have been "no answers

in regards to why she is having this mobility difficulty."



MENTAL STATUS EXAM:  The patient is alert and oriented x3.  Eye contact is

good.  Behavior is cooperative.  Speech rate and volume are within normal

limits.  Mood is euthymic.  Affect is full.  Thoughts are at times goal

directed, but more often circumstantial and tangential.  She denies being

suicidal or homicidal.  She denies the presence of hallucinations,

delusions, or paranoia.  Her concentration and her focus, she reports are

perfectly normal.  Her memory both short and long-term appears to be

adequate.  Her appetite and her sleep, she reports are normal.



In terms of reviewing her labs currently, her red blood cells, hemoglobin

and hematocrit as of the 26th are running a little bit low at 3.44 for the

RBCs, the hemoglobin was 11.8 and hematocrit was 35.5.  Additionally, her

blood culture had no growth after 5 days and in terms of her workup for

this problem of not being able to stand and walk.  She has had a chest

x-ray, a head CT, a lumbar spine CT, a brain MRI, a cervical spine MRI, 2

of them, 2 lumbar spine MRI, 2 thoracic spine MRIs to see if there was any

progressive changes overtime.  A lumbar spine MRI, there were really no

definitive findings in terms of her clinical pictures.



DIAGNOSTIC IMPRESSION:  Adjustment disorder with anxiety, obesity, and

ataxia.



PLAN:  The patient is psychiatrically clear at this time, she is in no

imminent danger of hurting herself or anyone else and has no symptoms of

psychosis.  She will be discharged back to [facility] for physical therapy and

rehabilitation.



Thank you for this consult.  This case was reviewed with Dr. Gunter.





__________________________________________

PRAMOD Merida



DD:  12/29/2017 11:19:23

DT:  12/29/2017 16:08:38

Job # [id number]

## 2018-04-05 ENCOUNTER — HOSPITAL ENCOUNTER (INPATIENT)
Dept: HOSPITAL 14 - H.ER | Age: 38
LOS: 8 days | Discharge: NURSING, REHAB OR LONG-TERM CARE | DRG: 243 | End: 2018-04-13
Attending: INTERNAL MEDICINE | Admitting: INTERNAL MEDICINE
Payer: MEDICAID

## 2018-04-05 DIAGNOSIS — R53.1: ICD-10-CM

## 2018-04-05 DIAGNOSIS — M50.222: Primary | ICD-10-CM

## 2018-04-05 DIAGNOSIS — Z88.0: ICD-10-CM

## 2018-04-05 DIAGNOSIS — Z91.81: ICD-10-CM

## 2018-04-05 DIAGNOSIS — R26.2: ICD-10-CM

## 2018-04-05 DIAGNOSIS — M47.898: ICD-10-CM

## 2018-04-05 LAB
ALBUMIN SERPL-MCNC: 3.8 G/DL (ref 3.5–5)
ALBUMIN/GLOB SERPL: 1 {RATIO} (ref 1–2.1)
ALT SERPL-CCNC: 32 U/L (ref 9–52)
AST SERPL-CCNC: 24 U/L (ref 14–36)
BASOPHILS # BLD AUTO: 0 K/UL (ref 0–0.2)
BASOPHILS NFR BLD: 0.8 % (ref 0–2)
BUN SERPL-MCNC: 10 MG/DL (ref 7–17)
CALCIUM SERPL-MCNC: 9.5 MG/DL (ref 8.4–10.2)
EOSINOPHIL # BLD AUTO: 0 K/UL (ref 0–0.7)
EOSINOPHIL NFR BLD: 0.5 % (ref 0–4)
ERYTHROCYTE [DISTWIDTH] IN BLOOD BY AUTOMATED COUNT: 15.5 % (ref 11.5–14.5)
GFR NON-AFRICAN AMERICAN: > 60
HGB BLD-MCNC: 16 G/DL (ref 12–16)
LYMPHOCYTES # BLD AUTO: 2.8 K/UL (ref 1–4.3)
LYMPHOCYTES NFR BLD AUTO: 50.6 % (ref 20–40)
MCH RBC QN AUTO: 29.7 PG (ref 27–31)
MCHC RBC AUTO-ENTMCNC: 32.4 G/DL (ref 33–37)
MCV RBC AUTO: 91.6 FL (ref 81–99)
MONOCYTES # BLD: 0.5 K/UL (ref 0–0.8)
MONOCYTES NFR BLD: 9.5 % (ref 0–10)
NEUTROPHILS # BLD: 2.2 K/UL (ref 1.8–7)
NEUTROPHILS NFR BLD AUTO: 38.6 % (ref 50–75)
NRBC BLD AUTO-RTO: 0.1 % (ref 0–0)
PLATELET # BLD: 288 K/UL (ref 130–400)
PMV BLD AUTO: 8.8 FL (ref 7.2–11.7)
RBC # BLD AUTO: 5.39 MIL/UL (ref 3.8–5.2)
WBC # BLD AUTO: 5.6 K/UL (ref 4.8–10.8)

## 2018-04-06 RX ADMIN — ENOXAPARIN SODIUM SCH MG: 40 INJECTION SUBCUTANEOUS at 08:58

## 2018-04-07 LAB — ALDOLASE SERPL-CCNC: 8.9 U/L (ref ?–8.1)

## 2018-04-07 RX ADMIN — ENOXAPARIN SODIUM SCH MG: 40 INJECTION SUBCUTANEOUS at 10:12

## 2018-04-08 RX ADMIN — ENOXAPARIN SODIUM SCH MG: 40 INJECTION SUBCUTANEOUS at 08:27

## 2018-04-09 LAB
APTT BLD: 35.1 SECONDS (ref 25.6–37.1)
INR PPP: 1.1 (ref 0.9–1.2)
PROTHROMBIN TIME: 12 SECONDS (ref 9.8–13.1)

## 2018-04-09 RX ADMIN — ENOXAPARIN SODIUM SCH MG: 40 INJECTION SUBCUTANEOUS at 09:49

## 2018-04-10 LAB
BUN SERPL-MCNC: 8 MG/DL (ref 7–17)
CALCIUM SERPL-MCNC: 8.9 MG/DL (ref 8.4–10.2)
ERYTHROCYTE [DISTWIDTH] IN BLOOD BY AUTOMATED COUNT: 15.4 % (ref 11.5–14.5)
GFR NON-AFRICAN AMERICAN: > 60
HGB BLD-MCNC: 13.6 G/DL (ref 12–16)
MCH RBC QN AUTO: 29.8 PG (ref 27–31)
MCHC RBC AUTO-ENTMCNC: 32.7 G/DL (ref 33–37)
MCV RBC AUTO: 91 FL (ref 81–99)
PLATELET # BLD: 232 K/UL (ref 130–400)
RBC # BLD AUTO: 4.58 MIL/UL (ref 3.8–5.2)
VIT B12 SERPL-MCNC: 349 PG/ML (ref 239–931)
WBC # BLD AUTO: 4.8 K/UL (ref 4.8–10.8)

## 2018-04-10 RX ADMIN — ENOXAPARIN SODIUM SCH MG: 40 INJECTION SUBCUTANEOUS at 08:30

## 2018-04-10 RX ADMIN — ACETAMINOPHEN AND CODEINE PHOSPHATE PRN TAB: 300; 30 TABLET ORAL at 04:26

## 2018-04-11 LAB
BUN SERPL-MCNC: 6 MG/DL (ref 7–17)
CALCIUM SERPL-MCNC: 8.9 MG/DL (ref 8.4–10.2)
GFR NON-AFRICAN AMERICAN: > 60

## 2018-04-11 RX ADMIN — ENOXAPARIN SODIUM SCH MG: 40 INJECTION SUBCUTANEOUS at 08:59

## 2018-04-11 RX ADMIN — ACETAMINOPHEN AND CODEINE PHOSPHATE PRN TAB: 300; 30 TABLET ORAL at 01:55

## 2018-04-12 RX ADMIN — ACETAMINOPHEN AND CODEINE PHOSPHATE PRN TAB: 300; 30 TABLET ORAL at 00:59

## 2018-04-12 RX ADMIN — ENOXAPARIN SODIUM SCH MG: 40 INJECTION SUBCUTANEOUS at 08:57

## 2018-04-13 ENCOUNTER — HOSPITAL ENCOUNTER (INPATIENT)
Dept: HOSPITAL 14 - H.REHABAC | Age: 38
LOS: 12 days | Discharge: HOME | DRG: 243 | End: 2018-04-25
Attending: INTERNAL MEDICINE | Admitting: INTERNAL MEDICINE
Payer: MEDICAID

## 2018-04-13 VITALS
RESPIRATION RATE: 20 BRPM | DIASTOLIC BLOOD PRESSURE: 79 MMHG | TEMPERATURE: 98.5 F | HEART RATE: 109 BPM | SYSTOLIC BLOOD PRESSURE: 121 MMHG | OXYGEN SATURATION: 96 %

## 2018-04-13 VITALS — BODY MASS INDEX: 38.9 KG/M2

## 2018-04-13 DIAGNOSIS — R53.1: ICD-10-CM

## 2018-04-13 DIAGNOSIS — M50.222: Primary | ICD-10-CM

## 2018-04-13 DIAGNOSIS — M48.00: ICD-10-CM

## 2018-04-13 DIAGNOSIS — K59.00: ICD-10-CM

## 2018-04-13 DIAGNOSIS — Z91.81: ICD-10-CM

## 2018-04-13 RX ADMIN — ENOXAPARIN SODIUM SCH MG: 40 INJECTION SUBCUTANEOUS at 09:04

## 2018-04-13 RX ADMIN — ACETAMINOPHEN AND CODEINE PHOSPHATE PRN TAB: 300; 30 TABLET ORAL at 00:53

## 2018-04-14 RX ADMIN — ENOXAPARIN SODIUM SCH MG: 40 INJECTION SUBCUTANEOUS at 09:00

## 2018-04-14 RX ADMIN — STANDARDIZED SENNA CONCENTRATE AND DOCUSATE SODIUM SCH TAB: 8.6; 5 TABLET, FILM COATED ORAL at 21:41

## 2018-04-15 LAB
BUN SERPL-MCNC: 4 MG/DL (ref 7–17)
CALCIUM SERPL-MCNC: 9.4 MG/DL (ref 8.4–10.2)
ERYTHROCYTE [DISTWIDTH] IN BLOOD BY AUTOMATED COUNT: 15.5 % (ref 11.5–14.5)
GFR NON-AFRICAN AMERICAN: > 60
HGB BLD-MCNC: 14.3 G/DL (ref 12–16)
MCH RBC QN AUTO: 29.8 PG (ref 27–31)
MCHC RBC AUTO-ENTMCNC: 32.8 G/DL (ref 33–37)
MCV RBC AUTO: 90.9 FL (ref 81–99)
PLATELET # BLD: 302 K/UL (ref 130–400)
RBC # BLD AUTO: 4.78 MIL/UL (ref 3.8–5.2)
WBC # BLD AUTO: 6.4 K/UL (ref 4.8–10.8)

## 2018-04-15 RX ADMIN — STANDARDIZED SENNA CONCENTRATE AND DOCUSATE SODIUM SCH TAB: 8.6; 5 TABLET, FILM COATED ORAL at 21:20

## 2018-04-15 RX ADMIN — ENOXAPARIN SODIUM SCH MG: 40 INJECTION SUBCUTANEOUS at 08:38

## 2018-04-15 RX ADMIN — PANTOPRAZOLE SODIUM SCH MG: 40 TABLET, DELAYED RELEASE ORAL at 08:38

## 2018-04-16 RX ADMIN — PANTOPRAZOLE SODIUM SCH MG: 40 TABLET, DELAYED RELEASE ORAL at 09:19

## 2018-04-16 RX ADMIN — ACETAMINOPHEN AND CODEINE PHOSPHATE PRN TAB: 300; 30 TABLET ORAL at 17:15

## 2018-04-16 RX ADMIN — ENOXAPARIN SODIUM SCH MG: 40 INJECTION SUBCUTANEOUS at 09:18

## 2018-04-16 RX ADMIN — STANDARDIZED SENNA CONCENTRATE AND DOCUSATE SODIUM SCH: 8.6; 5 TABLET, FILM COATED ORAL at 21:45

## 2018-04-16 RX ADMIN — PANTOPRAZOLE SODIUM SCH: 40 TABLET, DELAYED RELEASE ORAL at 09:25

## 2018-04-17 RX ADMIN — ENOXAPARIN SODIUM SCH MG: 40 INJECTION SUBCUTANEOUS at 08:59

## 2018-04-17 RX ADMIN — STANDARDIZED SENNA CONCENTRATE AND DOCUSATE SODIUM SCH: 8.6; 5 TABLET, FILM COATED ORAL at 22:00

## 2018-04-17 RX ADMIN — STANDARDIZED SENNA CONCENTRATE AND DOCUSATE SODIUM SCH TAB: 8.6; 5 TABLET, FILM COATED ORAL at 21:56

## 2018-04-18 LAB
BUN SERPL-MCNC: 5 MG/DL (ref 7–17)
CALCIUM SERPL-MCNC: 9.2 MG/DL (ref 8.4–10.2)
ERYTHROCYTE [DISTWIDTH] IN BLOOD BY AUTOMATED COUNT: 15.6 % (ref 11.5–14.5)
GFR NON-AFRICAN AMERICAN: > 60
HGB BLD-MCNC: 13.5 G/DL (ref 12–16)
MCH RBC QN AUTO: 29.6 PG (ref 27–31)
MCHC RBC AUTO-ENTMCNC: 32.6 G/DL (ref 33–37)
MCV RBC AUTO: 90.7 FL (ref 81–99)
PLATELET # BLD: 314 K/UL (ref 130–400)
RBC # BLD AUTO: 4.56 MIL/UL (ref 3.8–5.2)
WBC # BLD AUTO: 8.8 K/UL (ref 4.8–10.8)

## 2018-04-18 RX ADMIN — ACETAMINOPHEN AND CODEINE PHOSPHATE PRN TAB: 300; 30 TABLET ORAL at 09:33

## 2018-04-18 RX ADMIN — STANDARDIZED SENNA CONCENTRATE AND DOCUSATE SODIUM SCH: 8.6; 5 TABLET, FILM COATED ORAL at 22:21

## 2018-04-18 RX ADMIN — ACETAMINOPHEN AND CODEINE PHOSPHATE PRN TAB: 300; 30 TABLET ORAL at 00:27

## 2018-04-18 RX ADMIN — ENOXAPARIN SODIUM SCH MG: 40 INJECTION SUBCUTANEOUS at 09:34

## 2018-04-19 RX ADMIN — ENOXAPARIN SODIUM SCH MG: 40 INJECTION SUBCUTANEOUS at 09:01

## 2018-04-19 RX ADMIN — STANDARDIZED SENNA CONCENTRATE AND DOCUSATE SODIUM PRN TAB: 8.6; 5 TABLET, FILM COATED ORAL at 21:18

## 2018-04-20 RX ADMIN — ENOXAPARIN SODIUM SCH MG: 40 INJECTION SUBCUTANEOUS at 08:30

## 2018-04-20 RX ADMIN — STANDARDIZED SENNA CONCENTRATE AND DOCUSATE SODIUM PRN TAB: 8.6; 5 TABLET, FILM COATED ORAL at 19:51

## 2018-04-21 LAB
BUN SERPL-MCNC: 5 MG/DL (ref 7–17)
CALCIUM SERPL-MCNC: 8.9 MG/DL (ref 8.4–10.2)
ERYTHROCYTE [DISTWIDTH] IN BLOOD BY AUTOMATED COUNT: 15.7 % (ref 11.5–14.5)
GFR NON-AFRICAN AMERICAN: > 60
HGB BLD-MCNC: 12.9 G/DL (ref 12–16)
MCH RBC QN AUTO: 29.1 PG (ref 27–31)
MCHC RBC AUTO-ENTMCNC: 32.5 G/DL (ref 33–37)
MCV RBC AUTO: 89.5 FL (ref 81–99)
PLATELET # BLD: 323 K/UL (ref 130–400)
RBC # BLD AUTO: 4.43 MIL/UL (ref 3.8–5.2)
WBC # BLD AUTO: 9.3 K/UL (ref 4.8–10.8)

## 2018-04-21 RX ADMIN — ACETAMINOPHEN AND CODEINE PHOSPHATE PRN TAB: 300; 30 TABLET ORAL at 02:29

## 2018-04-21 RX ADMIN — ENOXAPARIN SODIUM SCH MG: 40 INJECTION SUBCUTANEOUS at 08:36

## 2018-04-22 RX ADMIN — ACETAMINOPHEN AND CODEINE PHOSPHATE PRN TAB: 300; 30 TABLET ORAL at 01:55

## 2018-04-22 RX ADMIN — ENOXAPARIN SODIUM SCH MG: 40 INJECTION SUBCUTANEOUS at 09:29

## 2018-04-23 RX ADMIN — ACETAMINOPHEN AND CODEINE PHOSPHATE PRN TAB: 300; 30 TABLET ORAL at 22:33

## 2018-04-23 RX ADMIN — ENOXAPARIN SODIUM SCH MG: 40 INJECTION SUBCUTANEOUS at 08:38

## 2018-04-23 RX ADMIN — ACETAMINOPHEN AND CODEINE PHOSPHATE PRN TAB: 300; 30 TABLET ORAL at 00:47

## 2018-04-24 VITALS — RESPIRATION RATE: 20 BRPM

## 2018-04-24 LAB
BUN SERPL-MCNC: 6 MG/DL (ref 7–17)
CALCIUM SERPL-MCNC: 8.9 MG/DL (ref 8.4–10.2)
ERYTHROCYTE [DISTWIDTH] IN BLOOD BY AUTOMATED COUNT: 15.8 % (ref 11.5–14.5)
GFR NON-AFRICAN AMERICAN: > 60
HGB BLD-MCNC: 12.6 G/DL (ref 12–16)
MCH RBC QN AUTO: 29.3 PG (ref 27–31)
MCHC RBC AUTO-ENTMCNC: 32.4 G/DL (ref 33–37)
MCV RBC AUTO: 90.6 FL (ref 81–99)
PLATELET # BLD: 327 K/UL (ref 130–400)
RBC # BLD AUTO: 4.29 MIL/UL (ref 3.8–5.2)
WBC # BLD AUTO: 11.1 K/UL (ref 4.8–10.8)

## 2018-04-24 RX ADMIN — ENOXAPARIN SODIUM SCH MG: 40 INJECTION SUBCUTANEOUS at 08:21

## 2018-04-24 RX ADMIN — ACETAMINOPHEN AND CODEINE PHOSPHATE PRN TAB: 300; 30 TABLET ORAL at 23:27

## 2018-04-25 VITALS
HEART RATE: 90 BPM | TEMPERATURE: 98.1 F | DIASTOLIC BLOOD PRESSURE: 90 MMHG | OXYGEN SATURATION: 99 % | SYSTOLIC BLOOD PRESSURE: 138 MMHG

## 2018-04-25 RX ADMIN — STANDARDIZED SENNA CONCENTRATE AND DOCUSATE SODIUM PRN TAB: 8.6; 5 TABLET, FILM COATED ORAL at 09:00

## 2018-04-25 RX ADMIN — ACETAMINOPHEN AND CODEINE PHOSPHATE PRN TAB: 300; 30 TABLET ORAL at 08:55

## 2018-04-25 RX ADMIN — ENOXAPARIN SODIUM SCH MG: 40 INJECTION SUBCUTANEOUS at 08:57

## 2019-05-25 ENCOUNTER — HOSPITAL ENCOUNTER (INPATIENT)
Dept: HOSPITAL 14 - H.ER | Age: 39
LOS: 5 days | Discharge: NURSING, REHAB OR LONG-TERM CARE | DRG: 243 | End: 2019-05-30
Attending: FAMILY MEDICINE | Admitting: FAMILY MEDICINE
Payer: MEDICAID

## 2019-05-25 VITALS — BODY MASS INDEX: 30.2 KG/M2

## 2019-05-25 DIAGNOSIS — M48.061: ICD-10-CM

## 2019-05-25 DIAGNOSIS — R51: ICD-10-CM

## 2019-05-25 DIAGNOSIS — M50.122: Primary | ICD-10-CM

## 2019-05-25 DIAGNOSIS — Z88.0: ICD-10-CM

## 2019-05-25 DIAGNOSIS — R26.81: ICD-10-CM

## 2019-05-25 DIAGNOSIS — M25.78: ICD-10-CM

## 2019-05-25 LAB
ALBUMIN SERPL-MCNC: 4 G/DL (ref 3.5–5)
ALBUMIN/GLOB SERPL: 1.3 {RATIO} (ref 1–2.1)
ALT SERPL-CCNC: 17 U/L (ref 9–52)
APTT BLD: 33.6 SECONDS (ref 25.6–37.1)
AST SERPL-CCNC: 18 U/L (ref 14–36)
BACTERIA #/AREA URNS HPF: (no result) /[HPF]
BASOPHILS # BLD AUTO: 0 K/UL (ref 0–0.2)
BASOPHILS NFR BLD: 0.9 % (ref 0–2)
BILIRUB UR-MCNC: (no result) MG/DL
BUN SERPL-MCNC: 10 MG/DL (ref 7–17)
CALCIUM SERPL-MCNC: 8.9 MG/DL (ref 8.4–10.2)
COLOR UR: (no result)
EOSINOPHIL # BLD AUTO: 0 K/UL (ref 0–0.7)
EOSINOPHIL NFR BLD: 0.1 % (ref 0–4)
ERYTHROCYTE [DISTWIDTH] IN BLOOD BY AUTOMATED COUNT: 17.3 % (ref 11.5–14.5)
GFR NON-AFRICAN AMERICAN: > 60
GLUCOSE UR STRIP-MCNC: (no result) MG/DL
HGB BLD-MCNC: 13.3 G/DL (ref 12–16)
INR PPP: 1.1
LEUKOCYTE ESTERASE UR-ACNC: (no result) LEU/UL
LYMPHOCYTES # BLD AUTO: 2 K/UL (ref 1–4.3)
LYMPHOCYTES NFR BLD AUTO: 37.1 % (ref 20–40)
MCH RBC QN AUTO: 38.2 PG (ref 27–31)
MCHC RBC AUTO-ENTMCNC: 33.7 G/DL (ref 33–37)
MCV RBC AUTO: 113.3 FL (ref 81–99)
MONOCYTES # BLD: 0.3 K/UL (ref 0–0.8)
MONOCYTES NFR BLD: 5.3 % (ref 0–10)
NEUTROPHILS # BLD: 3 K/UL (ref 1.8–7)
NEUTROPHILS NFR BLD AUTO: 56.6 % (ref 50–75)
NRBC BLD AUTO-RTO: 0.2 % (ref 0–0)
PH UR STRIP: 5 [PH] (ref 5–8)
PLATELET # BLD: 345 K/UL (ref 130–400)
PMV BLD AUTO: 7.1 FL (ref 7.2–11.7)
PROT UR STRIP-MCNC: 100 MG/DL
PROTHROMBIN TIME: 12.3 SECONDS (ref 9.8–13.1)
RBC # BLD AUTO: 3.48 MIL/UL (ref 3.8–5.2)
RBC # UR STRIP: (no result) /UL
SP GR UR STRIP: 1.03 (ref 1–1.03)
SQUAMOUS EPITHIAL: 7 /HPF (ref 0–5)
URINE CLARITY: (no result)
UROBILINOGEN UR-MCNC: 4 MG/DL (ref 0.2–1)
WBC # BLD AUTO: 5.3 K/UL (ref 4.8–10.8)

## 2019-05-26 RX ADMIN — ACETAMINOPHEN AND CODEINE PHOSPHATE PRN TAB: 300; 30 TABLET ORAL at 21:57

## 2019-05-27 RX ADMIN — ACETAMINOPHEN AND CODEINE PHOSPHATE PRN TAB: 300; 30 TABLET ORAL at 21:30

## 2019-05-28 LAB
BUN SERPL-MCNC: 6 MG/DL (ref 7–17)
CALCIUM SERPL-MCNC: 9.6 MG/DL (ref 8.4–10.2)
ERYTHROCYTE [DISTWIDTH] IN BLOOD BY AUTOMATED COUNT: 17.4 % (ref 11.5–14.5)
GFR NON-AFRICAN AMERICAN: > 60
HGB BLD-MCNC: 13.7 G/DL (ref 12–16)
MCH RBC QN AUTO: 38 PG (ref 27–31)
MCHC RBC AUTO-ENTMCNC: 33.4 G/DL (ref 33–37)
MCV RBC AUTO: 113.9 FL (ref 81–99)
PLATELET # BLD: 390 K/UL (ref 130–400)
RBC # BLD AUTO: 3.61 MIL/UL (ref 3.8–5.2)
WBC # BLD AUTO: 7.1 K/UL (ref 4.8–10.8)

## 2019-05-28 RX ADMIN — ACETAMINOPHEN AND CODEINE PHOSPHATE PRN TAB: 300; 30 TABLET ORAL at 08:59

## 2019-05-28 RX ADMIN — ACETAMINOPHEN AND CODEINE PHOSPHATE PRN TAB: 300; 30 TABLET ORAL at 23:29

## 2019-05-29 RX ADMIN — ACETAMINOPHEN AND CODEINE PHOSPHATE PRN TAB: 300; 30 TABLET ORAL at 22:46

## 2019-05-29 RX ADMIN — PANTOPRAZOLE SODIUM SCH MG: 40 TABLET, DELAYED RELEASE ORAL at 10:59

## 2019-05-30 VITALS
DIASTOLIC BLOOD PRESSURE: 86 MMHG | OXYGEN SATURATION: 95 % | HEART RATE: 89 BPM | SYSTOLIC BLOOD PRESSURE: 131 MMHG | TEMPERATURE: 97.5 F

## 2019-05-30 VITALS — RESPIRATION RATE: 19 BRPM

## 2019-05-30 RX ADMIN — ACETAMINOPHEN AND CODEINE PHOSPHATE PRN TAB: 300; 30 TABLET ORAL at 09:24

## 2019-05-30 RX ADMIN — PANTOPRAZOLE SODIUM SCH MG: 40 TABLET, DELAYED RELEASE ORAL at 09:25

## 2022-10-22 NOTE — ED PDOC
Arrival/HPI





- General


Historian: Patient





<Davis Tapia - Last Filed: 12/21/17 14:23>





<Sunny Moore - Last Filed: 12/24/17 09:46>





- General


Chief Complaint: Pain, Chronic


Time Seen by Provider: 12/20/17 19:47





- History of Present Illness


Narrative History of Present Illness (Text): 





12/20/17 20:05


36 y/o female, send in from [facility] for admission and further 

evaluation, amoxicillin allergy, biba for admission due to she is unable to 

walk.  pt. has been follow up by her own pmd for unable to walk with back pain 

and unable stand on her bilateral lower extremities since 12/08/2017, send to 

the ER because she is unable to stand with occasional pain, no palpitation, no 

rash, no numbness or tingling, no slurred speech or dizziness, no other medical 

or psychological complaints. 


 (Davis Tapia)





Past Medical History





- Provider Review


Nursing Documentation Reviewed: Yes





- Psychiatric


Hx Substance Use: No





<Davis Tapia - Last Filed: 12/21/17 14:23>





Family/Social History





- Physician Review


Nursing Documentation Reviewed: Yes


Family/Social History: Unknown Family HX


Smoking Status: n


Hx Alcohol Use: No


Hx Substance Use: No





<Davis Tapia - Last Filed: 12/21/17 14:23>





Allergies/Home Meds





<Davis Tapia - Last Filed: 12/21/17 14:23>





<Sunny Moore - Last Filed: 12/24/17 09:46>


Allergies/Adverse Reactions: 


Allergies





amoxicillin Allergy (Verified 12/20/17 19:47)


 SHORTNESS OF BREATH








Home Medications: 


 Home Meds











 Medication  Instructions  Recorded  Confirmed


 


Cyclobenzaprine HCl 5 mg PO TID 12/21/17 12/21/17





[Cyclobenzaprine HCl]   


 


Folic Acid [Folic Acid] 1 mg PO DAILY 12/21/17 12/21/17


 


Thiamine HCl [B-1] 100 mg PO DAILY 12/21/17 12/21/17














Review of Systems





- Review of Systems


Constitutional: absent: Fatigue, Fevers


Eyes: absent: Vision Changes


ENT: absent: Hearing Changes


Respiratory: absent: SOB, Cough


Cardiovascular: absent: Chest Pain


Gastrointestinal: absent: Abdominal Pain, Nausea, Vomiting


Musculoskeletal: Back Pain.  absent: Arthralgias


Skin: absent: Rash, Pruritis


Neurological: absent: Headache, Dizziness


Psychiatric: absent: Anxiety, Depression, Suicidal Ideation





<Davis Tapia - Last Filed: 12/21/17 14:23>





Physical Exam


Vital Signs Reviewed: Yes


Temperature: Afebrile


Blood Pressure: Normal


Pulse: Regular


Respiratory Rate: Normal


Appearance: Positive for: Well-Appearing, Non-Toxic


Pain Distress: Mild


Mental Status: Positive for: Alert and Oriented X 3





- Systems Exam


Head: Present: Atraumatic, Normocephalic


Pupils: Present: PERRL


Extroacular Muscles: Present: EOMI


Conjunctiva: Present: Normal


Mouth: Present: Moist Mucous Membranes


Neck: Present: Normal Range of Motion


Respiratory/Chest: Present: Clear to Auscultation, Good Air Exchange.  No: 

Respiratory Distress, Accessory Muscle Use


Cardiovascular: Present: Regular Rate and Rhythm, Normal S1, S2.  No: Murmurs


Abdomen: Present: Normal Bowel Sounds.  No: Tenderness, Distention, Peritoneal 

Signs


Back: Present: Normal Inspection


Upper Extremity: Present: Normal Inspection.  No: Cyanosis, Edema


Lower Extremity: Present: Normal Inspection, NORMAL PULSES, Normal ROM, 

Capillary Refill < 2 s.  No: Edema, CALF TENDERNESS, Tenderness, Swelling, 

Deformity


Neurological: Present: GCS=15, Speech Normal, Motor Func Grossly Intact, Memory 

Normal, Other (Bilateral lower extremities motor 5/5. )


Skin: Present: Warm, Dry, Normal Color.  No: Rashes


Psychiatric: Present: Alert, Oriented x 3, Normal Insight, Normal Concentration





<Davis Tapia - Last Filed: 12/21/17 14:23>





Vital Signs











  Temp Pulse Resp BP Pulse Ox


 


 12/21/17 03:00   78  16  128/82  100


 


 12/20/17 23:30   80  18  135/82  100


 


 12/20/17 19:40  98.8 F  97 H  18  133/70  99














Medical Decision Making





- Lab Interpretations


I have reviewed the lab results: Yes





- RAD Interpretation


: Radiologist





- EKG Interpretation


Interpreted by ED Physician: Yes





<Davis Tapia - Last Filed: 12/21/17 14:23>





<Sunny Moore - Last Filed: 12/24/17 09:46>


ED Course and Treatment: 





12/20/17 20:27


-Labs


-CT head/lumbar


-IVF


-Observe and reassess





12/21/17 00:29


-I spoke to the covering doctor for Dr. Diehl (send the patient here to the ER

) which Dr. Rojas is covering, request admission to the hospitalist for full 

neurological evaluation. 


-Labs are non-significant


-Pt. refused pain med 


-CT Head:  No definite acute intracranial abnormality.


-CT Lumbar: Mild central canal stenosis L4-L5 level. Mild neuroforaminal 

narrowing at L4-L5, and L5-S1 levels.





12/21/17 02:10


-I spoke to DR. Frazier and DR. Moore about the case, agreed this needs to be 

observed over night as she should get neurological evaluation with possibility 

for MS or GBS. There is no emergency for spinal tap.  (Davis Tapia)





- Lab Interpretations


Lab Results: 











 12/22/17 06:30 





 12/22/17 06:30 





 Lab Results





12/22/17 06:30: Total Creatine Kinase 54


12/22/17 06:30: Sodium 140, Potassium 4.0, Chloride 106, Carbon Dioxide 25, 

Anion Gap 13, BUN 8, Creatinine 0.7, Est GFR (African Amer) > 60, Est GFR (Non-

Af Amer) > 60, Random Glucose 83, Calcium 9.3, Phosphorus 4.7 H, Magnesium 1.9, 

Total Bilirubin 0.5, AST 31, ALT 35, Alkaline Phosphatase 71, Total Protein 7.2

, Albumin 3.8, Globulin 3.3, Albumin/Globulin Ratio 1.1


12/22/17 06:30: WBC 6.2, RBC 3.45 L, Hgb 12.1, Hct 36.1, .6, MCH 35.1 H, 

MCHC 33.5, RDW 13.8, Plt Count 389, MPV 10.2, Gran % 44.3 L, Lymph % (Auto) 

44.2 H, Mono % (Auto) 8.8 H, Eos % (Auto) 2.1, Baso % (Auto) 0.6, Gran # 2.73, 

Lymph # 2.7, Mono # 0.5, Eos # 0.1, Baso # 0.04


12/21/17 08:00: TSH 3rd Generation 1.16


12/21/17 06:45: Urine Color Yellow, Urine Appearance Clear, Urine pH 6.0, Ur 

Specific Gravity 1.025, Urine Protein Negative, Urine Glucose (UA) Negative, 

Urine Ketones 15 H, Urine Blood Trace-intact H, Urine Nitrate Negative, Urine 

Bilirubin Negative, Urine Urobilinogen 0.2, Ur Leukocyte Esterase Trace H, 

Urine RBC 0 - 2, Urine WBC 0 - 2, Ur Epithelial Cells 4 - 5, Urine Bacteria Few


12/21/17 06:20: WBC 6.5, RBC 3.38 L, Hgb 11.8 L, Hct 35.5 L, .0, MCH 34.9

, MCHC 33.2, RDW 13.7, Plt Count 381, MPV 10.5, Gran % 54.4, Lymph % (Auto) 34.6

, Mono % (Auto) 9.1 H, Eos % (Auto) 1.7, Baso % (Auto) 0.2, Gran # 3.52, Lymph 

# 2.2, Mono # 0.6, Eos # 0.1, Baso # 0.01


12/21/17 06:20: Sodium 141, Potassium 4.1, Chloride 106, Carbon Dioxide 25, 

Anion Gap 14, BUN 11, Creatinine 0.7, Est GFR (African Amer) > 60, Est GFR (Non-

Af Amer) > 60, Random Glucose 81, Calcium 9.4, Phosphorus 4.3, Magnesium 2.0, 

Total Bilirubin 0.4, AST 31, ALT 42, Alkaline Phosphatase 76, Total Creatine 

Kinase 62, Total Protein 7.1, Albumin 3.8, Globulin 3.3, Albumin/Globulin Ratio 

1.2, Vitamin B12 690, Folate 11.0


12/20/17 20:40: Beta HCG, Quant < 2.39


12/20/17 20:40: WBC 7.2, RBC 3.53, Hgb 12.4, Hct 37.0, .8, MCH 35.1 H, 

MCHC 33.5, RDW 13.6, Plt Count 388, MPV 10.2, Gran % 50.4, Lymph % (Auto) 38.0 H

, Mono % (Auto) 8.9 H, Eos % (Auto) 2.1, Baso % (Auto) 0.6, Gran # 3.65, Lymph 

# 2.7, Mono # 0.6, Eos # 0.2, Baso # 0.04


12/20/17 20:40: Sodium 140, Potassium 3.9, Chloride 106, Carbon Dioxide 23, 

Anion Gap 15, BUN 13, Creatinine 0.7, Est GFR (African Amer) > 60, Est GFR (Non-

Af Amer) > 60, Random Glucose 93, Calcium 9.5, Total Bilirubin 0.3, AST 33, ALT 

40, Alkaline Phosphatase 80, Total Creatine Kinase 75, Total Protein 7.6, 

Albumin 4.0, Globulin 3.6, Albumin/Globulin Ratio 1.1











- RAD Interpretation


Radiology Orders: 











12/20/17 20:12


HEAD W/O CONTRAST [CT] Stat 


LUMBAR SPINE W/O CONTRAST [CT] Stat 


CHEST ONE VIEW [RAD] Stat 





12/21/17 12:39


BRAIN W & WO CONTRAST [MRI] Routine 











CT Head:


FINDINGS:


Brain: No intracranial hemorrhage. No mass. No definite edema.


Ventricles: No hydrocephalus.


Bones/joints: No acute fracture.


Soft tissues: Unremarkable.


Sinuses: No acute sinusitis.


Mastoid air cells: No mastoid effusion.


Orbits: Unremarkable as visualized.


IMPRESSION:


1. No definite acute intracranial abnormality.


Thank you for allowing us to participate in the care of your patient.


Dictated and Authenticated by: Ramón Botello MD


12/20/2017 11:34 PM Eastern Time ([employer] & [place])


--------------------------------------------------------------------------------

-----------------


CT Lumbar:


FINDINGS:


Vertebrae: No acute fracture. Facet osteoarthrosis within lower lumbar spine.


Discs/spinal canal/neural foramina: Mild central canal stenosis L4-L5 level. 

Mild neuroforaminal


narrowing at L4-L5, and L5-S1 levels.


Soft tissues: Partial ossification of anterior longitudinal ligament.


IMPRESSION:


1. No fracture.


2. If symptoms persist, consider MRI for further evaluation.


3. Incidental/non-acute findings are described above.


Thank you for allowing us to participate in the care of your patient.


Dictated and Authenticated by: Ramón Botello MD


12/20/2017 11:41 PM Eastern Time ([place] & [place])


--------------------------------------------------------------------------------

-------------------


Chest xray: no active disease


 (Davis Tapia)





- EKG Interpretation


EKG Interpretation (Text): 





12/21/17 01:17 (Davis Tapia)





- Medication Orders


Current Medication Orders: 











Acetaminophen/Butalbital/Caffeine (Fioricet)  1 tab PO Q4H PRN


   PRN Reason: Headache


Docusate Sodium (Colace)  100 mg PO TID [facility]


   Last Admin: 12/23/17 18:16  Dose: 100 mg





Last Bowel Movement


 Document     12/23/17 18:16  ID  (Rec: 12/23/17 18:16  ID  [id number]


     Last Bowel Movement


      Last Bowel Movement                        12/23/17





Famotidine (Pepcid)  20 mg PO 1000,2200 [facility]


   Last Admin: 12/23/17 23:37 Dose:  Not Given


   Non-Admin Reason: Patient Refused





Heparin Sodium (Porcine) (Heparin)  5,000 units SC Q8 PURA


   PRN Reason: Protocol


   Last Admin: 12/24/17 06:00  Dose:  





Immune Globulin 40 gm/ (Miscellaneous)  400 mls @ 1 mls/min IV DAILY PURA


   Stop: 12/25/17 23:55


   Last Admin: 12/23/17 23:34  Dose: 1 mls/min





eMAR Start Stop


 Document     12/23/17 23:34  CMA  (Rec: 12/23/17 23:35  CMA  [id number]


     Intravenous Solution


      Start Date                                 12/23/17


      Start Time                                 23:35





Ibuprofen (Motrin Tab)  600 mg PO Q6H PRN


   PRN Reason: Pain, Mild (1-3)


   Last Admin: 12/23/17 01:11  Dose: 600 mg





MAR Pain/Vitals


 Document     12/23/17 01:11  KP  (Rec: 12/23/17 01:11    [id number])


     Pain Reassessment


      Is This A Pain ReAssessment?               No


     Sleep


      Is patient sleeping during reassessment?   No


     Presence of Pain


      Presence of Pain                           Yes


     Location


      Pain Location Body Site                    Back


Re-Assess: MAR Pain/Vitals


 Document     12/23/17 02:11  KP  (Rec: 12/23/17 03:00  KP  [id number]


     Pain Reassessment


      Is This A Pain ReAssessment?               Yes


     Sleep


      Is patient sleeping during reassessment?   No


     Presence of Pain


      Presence of Pain                           No








Discontinued Medications





Ibuprofen (Motrin Tab)  600 mg PO STAT STA


   Stop: 12/21/17 01:16


   Last Admin: 12/23/17 10:59  Dose: 600 mg





MAR Pain/Vitals


 Document     12/23/17 10:59  ID  (Rec: 12/23/17 11:00  ID  [id number]


     Pain Reassessment


      Is This A Pain ReAssessment?               No


     Sleep


      Is patient sleeping during reassessment?   No


     Presence of Pain


      Presence of Pain                           Yes


     Pain Scale Used


      Pain Scale Used                            Numeric


     Location


      Pain Location Body Site                    Generalized


      Description                                Intermittent


      Scale Used                                 Numeric


      Pain Behavior                              Moaning


                                                 Facial Grimacing


      Alleviating Factors                        Medication


Re-Assess: MAR Pain/Vitals


 Document     12/23/17 11:59  ID  (Rec: 12/23/17 12:39  ID  [id number]


     Pain Reassessment


      Is This A Pain ReAssessment?               No


     Sleep


      Is patient sleeping during reassessment?   No


     Presence of Pain


      Presence of Pain                           No














- PA / NP / Resident Statement


MD/ has reviewed & agrees with the documentation as recorded.





<Davis Tapia - Last Filed: 12/21/17 14:23>





- PA / NP / Resident Statement


MD/ has reviewed & agrees with the documentation as recorded.





<Sunny Moore - Last Filed: 12/24/17 09:46>





Disposition/Present on Arrival





- Present on Arrival


Any Indicators Present on Arrival: No


History of DVT/PE: No


History of Uncontrolled Diabetes: No


Urinary Catheter: No


History of Decub. Ulcer: No


History Surgical Site Infection Following: None





- Disposition


Have Diagnosis and Disposition been Completed?: Yes


Disposition Time: 00:30


Patient Plan: Observation





<Davis Tapia - Last Filed: 12/21/17 14:23>





<Sunny Moore - Last Filed: 12/24/17 09:46>





- Disposition


Diagnosis: 


 Low back pain, Spinal stenosis





Disposition: HOSPITALIZED


Patient Problems: 


 Current Active Problems











Problem Status Onset


 


Low back pain Acute  


 


Spinal stenosis Acute  











Condition: STABLE ppatient preference
